# Patient Record
Sex: MALE | Race: WHITE | Employment: OTHER | URBAN - METROPOLITAN AREA
[De-identification: names, ages, dates, MRNs, and addresses within clinical notes are randomized per-mention and may not be internally consistent; named-entity substitution may affect disease eponyms.]

---

## 2022-09-20 ENCOUNTER — OFFICE VISIT (OUTPATIENT)
Dept: ORTHOPEDIC SURGERY | Age: 67
End: 2022-09-20
Payer: MEDICARE

## 2022-09-20 DIAGNOSIS — M19.011 PRIMARY OSTEOARTHRITIS OF RIGHT SHOULDER: Primary | ICD-10-CM

## 2022-09-20 DIAGNOSIS — M25.512 BILATERAL SHOULDER PAIN, UNSPECIFIED CHRONICITY: ICD-10-CM

## 2022-09-20 DIAGNOSIS — M25.511 BILATERAL SHOULDER PAIN, UNSPECIFIED CHRONICITY: ICD-10-CM

## 2022-09-20 PROCEDURE — G8421 BMI NOT CALCULATED: HCPCS | Performed by: PHYSICIAN ASSISTANT

## 2022-09-20 PROCEDURE — 1123F ACP DISCUSS/DSCN MKR DOCD: CPT | Performed by: PHYSICIAN ASSISTANT

## 2022-09-20 PROCEDURE — G8427 DOCREV CUR MEDS BY ELIG CLIN: HCPCS | Performed by: PHYSICIAN ASSISTANT

## 2022-09-20 PROCEDURE — 3017F COLORECTAL CA SCREEN DOC REV: CPT | Performed by: PHYSICIAN ASSISTANT

## 2022-09-20 PROCEDURE — 4004F PT TOBACCO SCREEN RCVD TLK: CPT | Performed by: PHYSICIAN ASSISTANT

## 2022-09-20 PROCEDURE — 99204 OFFICE O/P NEW MOD 45 MIN: CPT | Performed by: PHYSICIAN ASSISTANT

## 2022-09-20 NOTE — PROGRESS NOTES
patient, alert and oriented, in no distress. WDWN. HEENT:  Normocephalic, atraumatic. Extraocular muscles are intact. Neck:  Supple, no lymphadenopathy. Heart:  Regular pulse exam on all extremities. Good color and warmth noted. Lungs:  Normal non-labored breathing with no obvious shortness of breath. Abdomen:  WNL's. Skin:  No signs of rash or bruising. Pysch: Answers questions appropriately, AO X 3. MSK:  Cervical spine: No tenderness. Appropriate active motion      SHOULDER   Left   Right   Skin Intact Intact   Radial Pulses 2+ symmetrical  2+ symmetrical   Myotomes Normal Normal   Dermatomes  Normal Normal   ROM  abduction 70  , ER 90   Strength Appropriate Appropriate   Atrophy None noted None noted   Effusion/Swelling  None None   Palpation Negative  Negative   Bicep Tendon Rupture  Negative negative   Bear Hug, Belly Press Negative Negative   Crossed Arm Adduction Test Not tested Not tested   Instability/Ant. Apprehension Test None  None   Impingement limited limited          X-rays bilateral shoulders:   AP, Grashey, outlet and axillary views of the right  and left shoulder were obtained today and reviewed in the office. They show significant bone on bone osteoarthritis of bilateral shoulders. X-ray impression:  Right shoulder arthritis, Left shoulder arthritis    A/Plan:     ICD-10-CM    1. Primary osteoarthritis of right shoulder  M19.011 CT SHOULDER RIGHT WO CONTRAST      2. Bilateral shoulder pain, unspecified chronicity  M25.511 XR SHOULDER BILATERAL STANDARD    M25.512 CT SHOULDER RIGHT WO CONTRAST          I had a long discussion with the patient regarding the natural history of the problem, as well as treatment options. The patient is at the point where he has tried and failed conservative options. He does not want to proceed with conservative measures. He has made the decision to proceed surgically. We discussed CT scan of the right shoulder for surgical planning.   GARY with Dr. Arleen Holman after. Discussed with the patient he will need to quit tobacco use as he is a smoker and this increases risk of failure and infection. Treatment:   Given the chronicity and severity of the patient's symptoms, we will obtain an CT. We will see them back after the scan and make a determination regarding further treatment. If there is a tear or other problem, they may require surgery. If negative, would recommend NSAID's, PT, or injection. They are amenable to this treatment plan. Return for CT results with Dr. Arleen Holman. Thank you for the opportunity to see and take care of your patients. If you ever have any questions please give me a call.    Sincerely,  KIKO SANDERS MA  09/20/22

## 2022-09-23 PROBLEM — L02.212 ABSCESS OF BACK: Status: ACTIVE | Noted: 2021-06-12

## 2022-09-23 PROBLEM — L82.1 SK (SEBORRHEIC KERATOSIS): Status: ACTIVE | Noted: 2018-03-13

## 2022-09-23 PROBLEM — G89.29 CHRONIC PAIN OF BOTH SHOULDERS: Status: ACTIVE | Noted: 2017-10-17

## 2022-09-23 PROBLEM — M25.512 CHRONIC PAIN OF BOTH SHOULDERS: Status: ACTIVE | Noted: 2017-10-17

## 2022-09-23 PROBLEM — M19.019 AC JOINT ARTHROPATHY: Status: ACTIVE | Noted: 2022-06-01

## 2022-09-23 PROBLEM — M19.019 LOCALIZED, PRIMARY OSTEOARTHRITIS OF SHOULDER REGION: Status: ACTIVE | Noted: 2022-09-23

## 2022-09-23 PROBLEM — R97.20 ELEVATED PSA: Status: ACTIVE | Noted: 2020-01-03

## 2022-09-23 PROBLEM — M25.511 CHRONIC PAIN OF BOTH SHOULDERS: Status: ACTIVE | Noted: 2017-10-17

## 2022-09-23 PROBLEM — R21 RASH AND OTHER NONSPECIFIC SKIN ERUPTION: Status: ACTIVE | Noted: 2020-12-12

## 2022-09-23 PROBLEM — F17.200 SMOKER: Status: ACTIVE | Noted: 2018-01-30

## 2022-09-23 PROBLEM — M75.102 LEFT ROTATOR CUFF TEAR ARTHROPATHY: Status: ACTIVE | Noted: 2022-09-01

## 2022-09-23 PROBLEM — L81.9 ATYPICAL PIGMENTED SKIN LESION: Status: ACTIVE | Noted: 2019-10-17

## 2022-09-23 PROBLEM — M12.812 LEFT ROTATOR CUFF TEAR ARTHROPATHY: Status: ACTIVE | Noted: 2022-09-01

## 2022-09-23 PROBLEM — N48.6 PEYRONIE DISEASE: Status: ACTIVE | Noted: 2020-07-07

## 2022-09-23 RX ORDER — TRIAMCINOLONE ACETONIDE 5 MG/G
CREAM TOPICAL 2 TIMES DAILY
COMMUNITY
Start: 2021-06-02

## 2022-09-23 RX ORDER — TADALAFIL 20 MG/1
20 TABLET ORAL PRN
COMMUNITY
Start: 2020-10-07

## 2022-09-23 RX ORDER — LEVOTHYROXINE SODIUM 0.12 MG/1
TABLET ORAL
COMMUNITY
Start: 2022-09-02

## 2022-09-23 RX ORDER — ALPRAZOLAM 0.5 MG/1
TABLET ORAL
COMMUNITY
Start: 2022-09-01

## 2023-02-20 ENCOUNTER — TELEPHONE (OUTPATIENT)
Dept: ORTHOPEDIC SURGERY | Age: 68
End: 2023-02-20

## 2023-02-20 DIAGNOSIS — M25.512 BILATERAL SHOULDER PAIN, UNSPECIFIED CHRONICITY: ICD-10-CM

## 2023-02-20 DIAGNOSIS — M19.011 PRIMARY OSTEOARTHRITIS OF RIGHT SHOULDER: Primary | ICD-10-CM

## 2023-02-20 DIAGNOSIS — M25.511 BILATERAL SHOULDER PAIN, UNSPECIFIED CHRONICITY: ICD-10-CM

## 2023-02-20 NOTE — TELEPHONE ENCOUNTER
Spoke with pt and let him know that we are very sorry to hear about his daughter. I let him know that I ordered that CT scan and have sent it over to [facility] and they will call him to schedule. I also let him know that once he has that scheduled that if he could call us back so we can get him on the schedule to go over the results, we usually need to bring them in about a week later so the scan can be uploaded. He expressed understanding and thanked me for calling.

## 2023-02-20 NOTE — TELEPHONE ENCOUNTER
He was seen in Sept. He was supposed to get a CT of his right shoulder but states he never heard from anyone. Soon after that appt his daughter passed away and he found her and he says he didn't think of anything else but that for a long time. His shoulder is really hurting and he would like to go ahead and get the CT. Can you let him know if this is possible.

## 2023-02-23 ENCOUNTER — TELEPHONE (OUTPATIENT)
Dept: ORTHOPEDIC SURGERY | Age: 68
End: 2023-02-23

## 2023-02-23 NOTE — TELEPHONE ENCOUNTER
Pt  called and  said  he  is  to  let  you know  that he  has  not  heard  from anyone  about  getting  his  CT

## 2023-02-24 ENCOUNTER — TELEPHONE (OUTPATIENT)
Dept: ORTHOPEDIC SURGERY | Age: 68
End: 2023-02-24

## 2023-02-24 NOTE — TELEPHONE ENCOUNTER
Left pt a VM on his other number letting him know that I think there has been a number issue and that may be why he has not heard from them. I changed the priority and also left the number for [facility] for him to call and schedule with them.

## 2023-02-24 NOTE — TELEPHONE ENCOUNTER
Spoke with pt and got him on the schedule to go over results with Dr. Michelle Caraballo after completion of scan. He thanked me for calling.

## 2023-02-28 DIAGNOSIS — M25.511 BILATERAL SHOULDER PAIN, UNSPECIFIED CHRONICITY: ICD-10-CM

## 2023-02-28 DIAGNOSIS — M25.512 BILATERAL SHOULDER PAIN, UNSPECIFIED CHRONICITY: ICD-10-CM

## 2023-02-28 DIAGNOSIS — M19.011 PRIMARY OSTEOARTHRITIS OF RIGHT SHOULDER: ICD-10-CM

## 2023-03-07 ENCOUNTER — OFFICE VISIT (OUTPATIENT)
Dept: ORTHOPEDIC SURGERY | Age: 68
End: 2023-03-07

## 2023-03-07 DIAGNOSIS — G56.21 CUBITAL TUNNEL SYNDROME ON RIGHT: ICD-10-CM

## 2023-03-07 DIAGNOSIS — M19.011 PRIMARY OSTEOARTHRITIS OF RIGHT SHOULDER: ICD-10-CM

## 2023-03-07 DIAGNOSIS — M25.512 BILATERAL SHOULDER PAIN, UNSPECIFIED CHRONICITY: Primary | ICD-10-CM

## 2023-03-07 DIAGNOSIS — M25.511 BILATERAL SHOULDER PAIN, UNSPECIFIED CHRONICITY: Primary | ICD-10-CM

## 2023-03-07 NOTE — PATIENT INSTRUCTIONS
Shoulder Joint Replacement  Although shoulder joint replacement is less common than knee or hip replacement, it is just as successful in relieving joint pain. Shoulder replacement surgery was first performed in the [facility] in the 1950s to treat severe shoulder fractures. Over the years, shoulder joint replacement has come to be used for many other painful conditions of the shoulder, such as different forms of arthritis. Today, about 53,000 people in the [place] have shoulder replacement surgery each year, according to the [employer] [facility] [employer]. This compares to more than 900,000 Americans a year who have knee and hip replacement surgery. If nonsurgical treatments like medications and activity changes are no longer helpful for relieving pain, you may want to consider shoulder joint replacement surgery. Joint replacement surgery is a safe and effective procedure to relieve pain and help you resume everyday activities. Whether you have just begun exploring treatment options or have already decided to have shoulder joint replacement surgery, this article will help you understand more about this valuable procedure. Anatomy  Your shoulder is made up of three bones: your upper arm bone (humerus), your shoulder blade (scapula), and your collarbone (clavicle). The shoulder is a ball-and-socket joint: The ball, or head, of your upper arm bone fits into a shallow socket in your shoulder blade. This socket is called the glenoid. The surfaces of the bones where they touch are covered with articular cartilage, a smooth substance that protects the bones and enables them to move easily. A thin, smooth tissue called synovial membrane covers all remaining surfaces inside the shoulder joint. In a healthy shoulder, this membrane makes a small amount of fluid that lubricates the cartilage and eliminates almost any friction in your shoulder.     The muscles and tendons that surround the shoulder provide stability and support. All of these structures allow the shoulder to rotate through a greater range of motion than any other joint in the body. Description  In shoulder replacement surgery, the damaged parts of the shoulder are removed and replaced with artificial components, called a prosthesis. The treatment options are either replacement of just the head of the humerus bone (ball), or replacement of both the ball and the socket (glenoid). Cause  Several conditions can cause shoulder pain and disability, and lead patients to consider shoulder joint replacement surgery. Osteoarthritis (Degenerative Joint Disease)  Osteoarthritis is an age-related wear-and-tear type of arthritis. It usually occurs in people 48years of age and older, but may occur in younger people, too. The cartilage that cushions the bones of the shoulder softens and wears away. The bones then rub against one another. Over time, the shoulder joint slowly becomes stiff and painful. Unfortunately, there is no way to prevent the development of osteoarthritis. It is a common reason people have shoulder replacement surgery. Rheumatoid Arthritis  This is a disease in which the synovial membrane that surrounds the joint becomes inflamed and thickened. This chronic inflammation can damage the cartilage and eventually cause cartilage loss, pain, and stiffness. Rheumatoid arthritis is the most common form of a group of disorders termed inflammatory arthritis. Post-traumatic Arthritis  This can follow a serious shoulder injury. Fractures of the bones that make up the shoulder or tears of the shoulder tendons or ligaments may damage the articular cartilage over time. This causes shoulder pain and limits shoulder function. Rotator Cuff Tear Arthropathy  A patient with a very large, long-standing rotator cuff tear may develop cuff tear arthropathy.  In this condition, the changes in the shoulder joint due to the rotator cuff tear may lead to arthritis and destruction of the joint cartilage. Avascular Necrosis (Osteonecrosis)  Avascular necrosis, or osteonecrosis, is a painful condition that occurs when the blood supply to the bone is disrupted. Because bone cells die without a blood supply, osteonecrosis can ultimately cause destruction of the shoulder joint and lead to arthritis. Chronic steroid use, deep sea diving, severe fracture of the shoulder, sickle cell disease, and heavy alcohol use are all risk factors for avascular necrosis. Severe Fractures  A severe fracture of the shoulder is another common reason people have shoulder replacements. When the head of the upper arm bone is shattered, it may be very difficult for a doctor to put the pieces of bone back in place. In addition, the blood supply to the bone pieces can be interrupted. In this case, a surgeon may recommend a shoulder replacement. Older patients with osteoporosis are most at risk for severe shoulder fractures. Failed Previous Shoulder Replacement Surgery  Although uncommon, some shoulder replacements fail, most often because of implant loosening, wear, infection, and dislocation. When this occurs, a second joint replacement surgery -- called a revision surgery -- may be necessary. Is Shoulder Joint Replacement for You? The decision to have shoulder replacement surgery should be a cooperative one between you, your family, your family physician, and your orthopaedic surgeon. There are several reasons why your doctor may recommend shoulder replacement surgery. People who benefit from surgery often have:    Severe shoulder pain that interferes with everyday activities, such as reaching into a cabinet, dressing, toileting, and washing. Moderate to severe pain while resting. This pain may be severe enough to prevent a good night's sleep. Loss of motion and/or weakness in the shoulder.   Failure to substantially improve with other treatments such as anti-inflammatory medications, cortisone injections, and/or physical therapy. Orthopaedic Evaluation  Your family physician may refer you to an orthopaedic surgeon for a thorough evaluation to determine if you can benefit from this surgery. An evaluation with an orthopaedic surgeon consists of several components:    A medical history. Your orthopaedic surgeon will gather information about your general health and ask you about the extent of your shoulder pain and your ability to function. A physical examination. This will assess shoulder motion, stability, and strength. X-rays. X-rays help to determine the extent of damage in your shoulder. They can show loss of the normal joint space between bones, flattening or irregularity in the shape of the bone, bone spurs, and loose pieces of cartilage or bone that may be floating inside the joint. Other tests. Occasionally, your doctor may order blood tests, a computed tomography (CT) scan, a magnetic resonance imaging (MRI) scan, or a bone scan to determine the condition of the bone and soft tissues of your shoulder. Your orthopaedic surgeon will review the results of your evaluation with you and discuss whether shoulder joint replacement is the best method to relieve your pain and improve your function. Other treatment options -- including medications, injections, physical therapy, or other types of surgery -- will also be discussed and considered. Shoulder Replacement Options  Shoulder replacement surgery is highly technical. It should be performed by a surgical team with experience in this procedure. There are different types of shoulder replacements. Your surgeon will evaluate your situation carefully before making any decisions. They will discuss with you which type of replacement will best meet your health needs. Do not hesitate to ask which type of implant will be used in your situation, and why that choice is right for you.     Total Shoulder Replacement  The standard total shoulder replacement involves replacing the arthritic joint surfaces with a highly polished metal ball attached to a stem, and a plastic socket. These components come in various sizes. They may be either cemented or press fit into the bone. If the bone is of good quality, your surgeon may choose to use a non-cemented (press-fit) humeral component. If the bone is soft, the humeral component may be implanted with bone cement. In most cases, an all-plastic glenoid (socket) component is implanted with bone cement. Implantation of a glenoid component is not advised if:    The glenoid has good cartilage  The glenoid bone is severely deficient  The rotator cuff tendons are irreparably torn  Patients with bone-on-bone osteoarthritis and intact rotator cuff tendons are generally good candidates for conventional total shoulder replacement. Stemmed Hemiarthroplasty  Depending on the condition of your shoulder, your surgeon may replace only the ball. This procedure is called a hemiarthroplasty. In a traditional hemiarthroplasty, the surgeon replaces the head of the humerus with a metal ball and stem, similar to the component used in a total shoulder replacement. This is called a stemmed hemiarthroplasty. Some surgeons recommend hemiarthroplasty when the humeral head is severely fractured but the socket is normal. Other indications for a hemiarthroplasty include:    Arthritis that involves only the head of the humerus, with a glenoid that has a healthy and intact cartilage surface  Shoulders with severely weakened bone in the glenoid  Some shoulders with severely torn rotator cuff tendons and arthritis  Sometimes, surgeons make the decision between a total shoulder replacement and a hemiarthroplasty in the operating room at the time of the surgery.     Studies show that patients with osteoarthritis get better pain relief from total shoulder arthroplasty than from hemiarthroplasty. Resurfacing Hemiarthroplasty  Resurfacing hemiarthroplasty involves replacing just the joint surface of the humeral head with a cap-like prosthesis without a stem. With its bone-preserving advantage, it offers those with arthritis of the shoulder an alternative to the standard stemmed shoulder replacement. Resurfacing hemiarthroplasty may be an option for you if:    The glenoid still has an intact cartilage surface  There has been no fresh fracture of the humeral neck or head  There is a desire to preserve humeral bone  For patients who are young or very active, resurfacing hemiarthroplasty avoids the risks of component wear and loosening that may occur with conventional total shoulder replacements in this patient population. Due to its more conservative nature, resurfacing hemiarthroplasty may be easier to convert to total shoulder replacement, if necessary, at a later time. Reverse Total Shoulder Replacement  Another type of shoulder replacement is called reverse total shoulder replacement. Reverse total shoulder replacement is used for people who have:    Completely torn rotator cuffs with severe arm weakness  The effects of severe arthritis and rotator cuff tearing (cuff tear arthropathy)  Had a previous shoulder replacement that failed    For these individuals, a conventional total shoulder replacement can still leave them with pain. They may also be unable to lift their arm up past a 90-degree angle. Not being able to lift your arm away from the side can be severely debilitating. In reverse total shoulder replacement, the socket and metal ball are switched: A metal ball is attached to the shoulder bone, and a plastic socket is attached to the upper arm bone. This allows the patient to use the deltoid muscle instead of the torn rotator cuff to lift the arm.       Complications  Your orthopaedic surgeon will explain the potential risks and complications of shoulder joint replacement, including those related to the surgery itself and those that can occur over time after your surgery. When complications occur, most are successfully treatable. Possible complications include the following. Infection. Infection is a complication of any surgery. In shoulder joint replacement, infection may occur in the wound or deep around the prosthesis. It may happen while in the hospital or after you go home. It may even occur years later. Minor infections in the wound area are generally treated with antibiotics. Major or deep infections may require more surgery and removal of the prosthesis. Any infection in your body can spread to your joint replacement. Prosthesis Problems. Although prosthesis designs and materials, as well as surgical techniques, continue to advance, the prosthesis may wear down and the components may loosen. The components of a shoulder replacement may also dislocate. Excessive wear, loosening, or dislocation may require additional surgery (revision procedure). Nerve damage. Nerves in the vicinity of the joint replacement may be damaged during surgery, although this type of injury is infrequent. Over time, these nerve injuries often improve and may completely recover. Preparing for Surgery  Medical Evaluation  If you decide to have shoulder replacement surgery, your orthopaedic surgeon may ask you to schedule a complete physical examination with your family physician several weeks before surgery. This is needed to make sure you are healthy enough to have the surgery and complete the recovery process. Many patients with chronic medical conditions, like heart disease, must also be evaluated by a specialist, such as a cardiologist, before the surgery. Medications  Be sure to talk to your orthopaedic surgeon about the medications you take. Some medications may need to be stopped before surgery.  For example, the following over-the-counter medicines may cause excessive bleeding and should be stopped 2 weeks before surgery:    Non-steroidal anti-inflammatory drugs (NSAIDs), such as aspirin, ibuprofen, and naproxen  Most arthritis medications    If you take blood thinners, either your primary care doctor or cardiologist will advise you about stopping these medications before surgery. Home Planning  Making simple changes in your home before surgery can make your recovery period easier. For the first several weeks after your surgery, it will be hard to reach high shelves and cupboards. Before your surgery, be sure to go through your home and place any items you may need afterwards on low shelves. When you come home from the hospital, you will need help for a few weeks with some daily tasks like dressing, bathing, cooking, and laundry. If you will not have any support at home immediately after surgery, you may need a short stay in a rehabilitation facility until you become more independent. Your Surgery  Before Your Operation  Wear loose-fitting clothes and a button-front shirt when you go to the hospital for your surgery. After surgery, you will be wearing a sling and will have limited use of your arm. You will most likely be admitted to the hospital on the day of your surgery. After admission, you will be taken to the preoperative preparation area and will meet a doctor from the anesthesia department. You, your anesthesiologist, and your surgeon will discuss the type of anesthesia to be used. You may be provided a general anesthetic (you are asleep for the entire operation), a regional anesthetic (you may be awake but have no feeling around the surgical area), or a combination of both types. Surgical Procedure  The procedure to replace your shoulder joint with an artificial device usually takes about 2 hours. After surgery, you will be moved to the recovery room, where you will remain for several hours while your recovery from anesthesia is monitored.  After you wake up, you will be taken to your hospital room. Recovery  Your medical team will give you several doses of antibiotics to prevent infection. Most patients are able to eat solid food and get out of bed the day after surgery. You will most likely be able to go home on the first, second, or third day after surgery. Pain Management  After surgery, you will feel some pain. This is a natural part of the healing process. Your doctor and nurses will work to reduce your pain, which can help you recover from surgery faster. Medications are often prescribed for short-term pain relief after surgery. Many types of medicines are available to help manage pain, including opioids, non-steroidal anti-inflammatory drugs (NSAIDs), and local anesthetics. Your doctor may use a combination of these medications to improve pain relief, as well as minimize the need for opioids. Be aware that although opioids help relieve pain after surgery, they are a narcotic and can be addictive. Opioid dependency and overdose has become a critical public health issue in the [place] It is important to use opioids only as directed by your doctor and to stop taking them as soon as your pain begins to improve. Talk to your doctor if your pain has not begun to improve within a few days of your surgery. Pain management is an important part of your recovery. You will begin physical therapy soon after surgery, and when you feel less pain, you can start moving sooner and get your strength back more quickly. Talk with your doctor if postoperative pain becomes a problem. Rehabilitation  A careful, well-planned rehabilitation program is critical to the success of a shoulder replacement. You usually start gentle physical therapy soon after the operation. Your surgeon or physical therapist will provide you with a home exercise program to strengthen your shoulder and improve flexibility.     Your Recovery At Home  When you leave the hospital, your arm will be in a sling. You will need the sling to support and protect your shoulder for the first 2 to 6 weeks after surgery, depending on the complexity of your surgery and your surgeon's preference. Wound care. You will have staples running along your wound or a suture beneath your skin. The staples will be removed several weeks after surgery. A dissolving suture beneath your skin will not require removal.    Avoid soaking the wound in water until it has thoroughly sealed and dried. You may continue to bandage the wound to prevent irritation from clothing. Activity. Exercise is a critical component of home care, particularly during the first few weeks after surgery. Follow your surgeon's home exercise plan to help you regain strength. Most patients are able to perform simple activities such as eating, dressing, and grooming, within 2 weeks after surgery. Some pain with activity and at night is common for several weeks after surgery. You are not allowed to drive a car for 2 to 6 weeks after surgery. Do's and Don'ts  The success of your surgery will depend largely on how well you follow your orthopaedic surgeon's instructions at home during the first few weeks after surgery. Here are some common do's and don'ts for when you return home:    Don't use the arm to push yourself up in bed or from a chair because this requires forceful contraction of muscles. Do follow the program of home exercises prescribed for you. You may need to do the exercises 2 to 3 times a day for a month or more. Don't overdo it! If your shoulder pain was severe before the surgery, the experience of pain-free motion may lull you into thinking that you can do more than is prescribed. Early overuse of the shoulder may result in severe limitations in motion. Don't lift anything heavier than a glass of water for the first 2 to 4 weeks after surgery. Do ask for assistance.  Your physician may be able to recommend an agency or facility to help if you do not have home support. Don't participate in contact sports or do any repetitive heavy lifting after your shoulder replacement. Do avoid placing your arm in any extreme position, such as straight out to the side or behind your body for the first 6 weeks after surgery. Many thousands of patients have experienced an improved quality of life after shoulder joint replacement surgery. They experience less pain, improved motion and strength, and better function. Research  There are ongoing efforts to design and develop newer and better shoulder replacements that can be performed with less invasive surgical techniques. And researchers are collecting data to determine which patients are the best candidates for which type of shoulder replacement surgery. This information will allow your surgeon to offer you the best recommendation for the treatment of your arthritic shoulder. To help doctors in the management of glenohumeral joint arthritis -- the [employer] has conducted research to provide some useful guidelines. These are recommendations only and may not apply to every case. For more information: Glenohumeral Joint Osteoarthritis - Clinical Practice Guideline (CPG)  [employer] (aaos. org)

## 2023-03-07 NOTE — PROGRESS NOTES
Name: Kameron Nunes  YOB: 1955  Gender: male  MRN: [id number]    CC:   Chief Complaint   Patient presents with    Follow-up     CT results right shoulder    Right shoulder(s) pain, stiffness    HPI: Patient presents for CT follow-up of the right shoulder for surgical Eduardo Partida discussion previously seen my PA. This patient presents with a 20 year history of left shoulder pain and a 4 year history of right shoulder pain. Patient reports feeling a deep pain in both shoulders. He has been experiencing popping, cracking, and grinding in the shoulder joint. Patient reports interference with sleep. He has tried Voltaren with only temporary relief. States over the last couple of weeks he has noticed more numbness and tingling rating down his forearm into his small and ring finger. No Known Allergies  History reviewed. No pertinent past medical history. History reviewed. No pertinent surgical history. History reviewed. No pertinent family history. Social History     Socioeconomic History    Marital status: Unknown     Spouse name: Not on file    Number of children: Not on file    Years of education: Not on file    Highest education level: Not on file   Occupational History    Not on file   Tobacco Use    Smoking status: Every Day     Types: Cigarettes    Smokeless tobacco: Never   Substance and Sexual Activity    Alcohol use: Not on file    Drug use: Not on file    Sexual activity: Not on file   Other Topics Concern    Not on file   Social History Narrative    Not on file     Social Determinants of Health     Financial Resource Strain: Not on file   Food Insecurity: Not on file   Transportation Needs: Not on file   Physical Activity: Not on file   Stress: Not on file   Social Connections: Not on file   Intimate Partner Violence: Not on file   Housing Stability: Not on file        No flowsheet data found.       Review of Systems  Hypothyroidism, smoking  Also noted on the patient medical history form on the chart and are reviewed today. Pertinent positives and negatives are addressed with the patient, particularly those related to musculoskeletal concerns. Non-orthopaedic concerns were referred back to the primary care physician. PE:    GEN: General appearance is that of a healthy patient, alert and oriented, in no distress. WDWN. HEENT:  Normocephalic, atraumatic. Extraocular muscles are intact. Neck:  Supple, no lymphadenopathy. Heart:  Regular pulse exam on all extremities. Good color and warmth noted. Lungs:  Normal non-labored breathing with no obvious shortness of breath. Abdomen:  WNL's. Skin:  No signs of rash or bruising. Pysch: Answers questions appropriately, AO X 3. MSK:  Cervical spine: No tenderness. Normal active motion. normal Spurling's maneuver. SHOULDER   Right (involved)  left   Skin Intact Intact   Radial Pulses 2+ symmetrical  2+ symmetrical   Myotomes Normal Normal   Dermatomes  Normal Normal   ROM Flexion 75, abduction 65 external rotation -10 Flexion 80 abduction 60 external rotation -15   Strength Moderate weakness in the scapular plane Weakness with external rotation as well as scapular plane testing   Atrophy None noted None noted   Effusion/Swelling  None None   Palpation Mild anterior tenderness Same   Bicep Tendon Rupture  Negative Negative   Bear Hug, Belly Press Not tested Not tested   Crossed Arm Adduction Test Not tested Not tested   Instability/Ant. Apprehension Test None  None   Impingement limited Limited          CT right shoulder from 2-28-23:  FINDINGS: Severe erosive osteoarthrosis of the glenohumeral joint with  severe joint narrowing, subchondral sclerosis and erosions along the  articular margins of the humerus and the glenoid. Bony hypertrophy of the  glenoid circumferentially noted. Large spur along the inferior margin of the  humerus. .  Small joint effusion. No radiopaque loose bodies are seen.   31 degrees retroversion of the glenoid. Glenoid stock estimated 27 mm. Walch type B2 glenoid configuration. Mild degenerative disease of the [facility] joint. No significant bony hypertrophy  is seen. Narrowing of the acromiohumeral interval. No full-thickness tear  of the rotator cuff is seen. Remainder of the bony shoulder girdle and bony thorax intact. Imaged lung demonstrates moderate emphysema with scattered areas of pulmonary  fibrosis, scarring and dependent atelectasis. Moderate number of shoddy right axillary lymph nodes. CONCLUSION: Advanced glenohumeral erosive osteoarthrosis as described. Has almost 21 degrees of retroversion. On Equinox planning appendectomy would be an 8 degree augmented baseplate with 42 glenosphere which would give at least 12 mm of lateralization and approximately 24 mm of lengthening. A/Plan:     ICD-10-CM    1. Bilateral shoulder pain, unspecified chronicity  M25.511 Ambulatory referral to [address] [place] [address]    M25.512       2. Primary osteoarthritis of right shoulder  M19.011 Ambulatory referral to [address] [place] [address]      3. Cubital tunnel syndrome on right  G56.21 Ambulatory referral to [address] [place] [address]          I had a long discussion with the patient regarding the natural history of the problem, as well as treatment options. Treatment: We will set him up for nerve conduction study to evaluate for cubital tunnel given the likely need for reverse shoulder replacement. He has severe retroversion and severe glenohumeral arthritic change. His motion is severely limited as well. Also discussed smoking cessation prior to surgery. A handout from the [employer] on total shoulder replacement was provided. Return after NCS. Thank you for the opportunity to see and take care of your patients. If you ever have any questions please give me a call.    Sincerely,  Chela Eden MD  03/07/23

## 2023-03-16 ENCOUNTER — PROCEDURE VISIT (OUTPATIENT)
Dept: PHYSICAL MEDICINE AND REHAB | Age: 68
End: 2023-03-16

## 2023-03-16 VITALS
HEIGHT: 71 IN | SYSTOLIC BLOOD PRESSURE: 99 MMHG | BODY MASS INDEX: 26.46 KG/M2 | WEIGHT: 189 LBS | DIASTOLIC BLOOD PRESSURE: 72 MMHG | HEART RATE: 97 BPM

## 2023-03-16 DIAGNOSIS — G56.01 RIGHT CARPAL TUNNEL SYNDROME: Primary | ICD-10-CM

## 2023-03-21 ENCOUNTER — TELEPHONE (OUTPATIENT)
Dept: ORTHOPEDIC SURGERY | Age: 68
End: 2023-03-21

## 2023-03-21 NOTE — TELEPHONE ENCOUNTER
Patient calling to say he has had the emg done and was to call when that was completed to discuss surgery

## 2023-03-28 ENCOUNTER — OFFICE VISIT (OUTPATIENT)
Dept: ORTHOPEDIC SURGERY | Age: 68
End: 2023-03-28
Payer: MEDICARE

## 2023-03-28 DIAGNOSIS — M25.512 BILATERAL SHOULDER PAIN, UNSPECIFIED CHRONICITY: Primary | ICD-10-CM

## 2023-03-28 DIAGNOSIS — M25.511 BILATERAL SHOULDER PAIN, UNSPECIFIED CHRONICITY: Primary | ICD-10-CM

## 2023-03-28 DIAGNOSIS — G56.21 CUBITAL TUNNEL SYNDROME ON RIGHT: ICD-10-CM

## 2023-03-28 DIAGNOSIS — M19.011 PRIMARY OSTEOARTHRITIS OF RIGHT SHOULDER: ICD-10-CM

## 2023-03-28 DIAGNOSIS — G56.01 CARPAL TUNNEL SYNDROME OF RIGHT WRIST: ICD-10-CM

## 2023-03-28 PROBLEM — N18.2 STAGE 2 CHRONIC KIDNEY DISEASE: Status: ACTIVE | Noted: 2022-12-01

## 2023-03-28 PROBLEM — F51.01 PRIMARY INSOMNIA: Status: ACTIVE | Noted: 2023-03-01

## 2023-03-28 PROCEDURE — 3017F COLORECTAL CA SCREEN DOC REV: CPT | Performed by: ORTHOPAEDIC SURGERY

## 2023-03-28 PROCEDURE — G8417 CALC BMI ABV UP PARAM F/U: HCPCS | Performed by: ORTHOPAEDIC SURGERY

## 2023-03-28 PROCEDURE — L3670 SO ACRO/CLAV CAN WEB PRE OTS: HCPCS | Performed by: ORTHOPAEDIC SURGERY

## 2023-03-28 PROCEDURE — G8484 FLU IMMUNIZE NO ADMIN: HCPCS | Performed by: ORTHOPAEDIC SURGERY

## 2023-03-28 PROCEDURE — 4004F PT TOBACCO SCREEN RCVD TLK: CPT | Performed by: ORTHOPAEDIC SURGERY

## 2023-03-28 PROCEDURE — 1123F ACP DISCUSS/DSCN MKR DOCD: CPT | Performed by: ORTHOPAEDIC SURGERY

## 2023-03-28 PROCEDURE — 99215 OFFICE O/P EST HI 40 MIN: CPT | Performed by: ORTHOPAEDIC SURGERY

## 2023-03-28 PROCEDURE — G8427 DOCREV CUR MEDS BY ELIG CLIN: HCPCS | Performed by: ORTHOPAEDIC SURGERY

## 2023-03-28 NOTE — PROGRESS NOTES
Patient was fitted and instructed on the use of a size L Ultrasling for the patient's right shoulder. I provided the following instructions along with proper fitting as noted below. Fitting instructions included   How to adjusting the thumb support and avoiding irritation to hand. Patient was instructed this should not be used until the block given at surgery has worn off and patient has regained feeling in hand. How to manage the quick release connection. The shoulder straps are adjusted to insure correct fit including trimming any excess material.   The shoulder strap crosses over the opposite shoulder being sure to avoid excess pull on neck  Placement of pillow placed at the waist line of the affected shoulder with the Velcro facing away from body allowing for sling attachment. Positioning the elbow in sling as far back as possible providing adequate support  Keeping the forearm close to the body preventing excessive ER   Keeping the hand higher than the elbow to allow for adequate support of arm in sling and avoiding excess swelling to hand. How to detach the shoulder strap valentine and open front panel to allow for proper hygiene. Patient was informed waist belt should stay in place at all times unless told otherwise by the Doctor or Physical Therapist.   Patient was also made aware to bring an oversized shirt to surgery to provide coverage and comfort when leaving the hospital.   The patient was instructed to bring ultrasling, oversized shirt, and iceman pad (if purchased or prescribed) with them on the day of surgery. Patient was fitted and instructed on the use of an Ultrasling for the patients right shoulder. I fitted patient with a size L ultrasling. Patient was instructed to position elbow in sling as far back as possible and that the arm should be internally rotated lying nicely against abdomen.  I demonstrated how the shoulder strap crosses over the opposite shoulder and connects to the
testing   Atrophy None noted None noted   Effusion/Swelling  None None   Palpation Mild anterior tenderness Same   Bicep Tendon Rupture  Negative Negative   Bear Hug, Belly Press Not tested Not tested   Crossed Arm Adduction Test Not tested Not tested   Instability/Ant. Apprehension Test None  None   Impingement limited Limited              EMG/NCS from 3-20-23:      CT right shoulder from 2-28-23:  FINDINGS: Severe erosive osteoarthrosis of the glenohumeral joint with  severe joint narrowing, subchondral sclerosis and erosions along the  articular margins of the humerus and the glenoid. Bony hypertrophy of the  glenoid circumferentially noted. Large spur along the inferior margin of the  humerus. .  Small joint effusion. No radiopaque loose bodies are seen. 31 degrees retroversion of the glenoid. Glenoid stock estimated 27 mm. Walch type B2 glenoid configuration. [facility] joint. No significant bony hypertrophy  is seen. Narrowing of the acromiohumeral interval. No full-thickness tear  of the rotator cuff is seen. Remainder of the bony shoulder girdle and bony thorax intact. Imaged lung demonstrates moderate emphysema with scattered areas of pulmonary  fibrosis, scarring and dependent atelectasis. Moderate number of shoddy right axillary lymph nodes. CONCLUSION: Advanced glenohumeral erosive osteoarthrosis as described. Has almost 21 degrees of retroversion. On Equinox planning natali would be an 8 degree augmented baseplate with 42 glenosphere which would give at least 12 mm of lateralization and approximately 24 mm of lengthening. A/Plan:     ICD-10-CM    1. Bilateral shoulder pain, unspecified chronicity  M25.511 Ambulatory Referral [place] [facility]    M25.512 Ambulatory referral to Orthopedic Surgery      2. Primary osteoarthritis of right shoulder  M19.011 Ambulatory Referral to [facility]     Ambulatory referral to Orthopedic Surgery      3.  Cubital tunnel syndrome on right  G56.21

## 2023-03-29 ENCOUNTER — TELEPHONE (OUTPATIENT)
Dept: ORTHOPEDIC SURGERY | Age: 68
End: 2023-03-29

## 2023-03-29 DIAGNOSIS — M25.512 BILATERAL SHOULDER PAIN, UNSPECIFIED CHRONICITY: Primary | ICD-10-CM

## 2023-03-29 DIAGNOSIS — M19.011 PRIMARY OSTEOARTHRITIS OF RIGHT SHOULDER: ICD-10-CM

## 2023-03-29 DIAGNOSIS — M25.511 BILATERAL SHOULDER PAIN, UNSPECIFIED CHRONICITY: Primary | ICD-10-CM

## 2023-03-29 NOTE — TELEPHONE ENCOUNTER
I called and left the pt a voicemail with my direct number to call back in regards to surgery information.

## 2023-04-05 ENCOUNTER — HOSPITAL ENCOUNTER (OUTPATIENT)
Dept: SURGERY | Age: 68
Discharge: HOME | End: 2023-04-08
Payer: MEDICARE

## 2023-04-05 VITALS
TEMPERATURE: 98.8 F | HEIGHT: 71 IN | HEART RATE: 77 BPM | WEIGHT: 189.4 LBS | SYSTOLIC BLOOD PRESSURE: 121 MMHG | OXYGEN SATURATION: 96 % | DIASTOLIC BLOOD PRESSURE: 82 MMHG | BODY MASS INDEX: 26.52 KG/M2 | RESPIRATION RATE: 16 BRPM

## 2023-04-05 LAB
ANION GAP SERPL CALC-SCNC: 0 MMOL/L (ref 2–11)
BACTERIA SPEC CULT: NORMAL
BUN SERPL-MCNC: 20 MG/DL (ref 8–23)
CALCIUM SERPL-MCNC: 9 MG/DL (ref 8.3–10.4)
CHLORIDE SERPL-SCNC: 112 MMOL/L (ref 101–110)
CO2 SERPL-SCNC: 31 MMOL/L (ref 21–32)
CREAT SERPL-MCNC: 1.06 MG/DL (ref 0.8–1.5)
ERYTHROCYTE [DISTWIDTH] IN BLOOD BY AUTOMATED COUNT: 12.8 % (ref 11.9–14.6)
GLUCOSE SERPL-MCNC: 86 MG/DL (ref 65–100)
HCT VFR BLD AUTO: 44.1 % (ref 41.1–50.3)
HGB BLD-MCNC: 15.3 G/DL (ref 13.6–17.2)
MCH RBC QN AUTO: 33 PG (ref 26.1–32.9)
MCHC RBC AUTO-ENTMCNC: 34.7 G/DL (ref 31.4–35)
MCV RBC AUTO: 95 FL (ref 82–102)
NRBC # BLD: 0 K/UL (ref 0–0.2)
PLATELET # BLD AUTO: 340 K/UL (ref 150–450)
PMV BLD AUTO: 10.3 FL (ref 9.4–12.3)
POTASSIUM SERPL-SCNC: 4 MMOL/L (ref 3.5–5.1)
RBC # BLD AUTO: 4.64 M/UL (ref 4.23–5.6)
SERVICE CMNT-IMP: NORMAL
SODIUM SERPL-SCNC: 143 MMOL/L (ref 133–143)
WBC # BLD AUTO: 9.7 K/UL (ref 4.3–11.1)

## 2023-04-05 PROCEDURE — 85027 COMPLETE CBC AUTOMATED: CPT

## 2023-04-05 PROCEDURE — 80048 BASIC METABOLIC PNL TOTAL CA: CPT

## 2023-04-05 PROCEDURE — 87641 MR-STAPH DNA AMP PROBE: CPT

## 2023-04-05 RX ORDER — VITAMIN B COMPLEX
TABLET ORAL DAILY
COMMUNITY

## 2023-04-05 RX ORDER — IBUPROFEN 200 MG
600 TABLET ORAL 2 TIMES DAILY
COMMUNITY

## 2023-04-05 RX ORDER — LEVOTHYROXINE SODIUM 0.1 MG/1
100 TABLET ORAL DAILY
COMMUNITY

## 2023-04-05 RX ORDER — BETA-SITOS/D3/MINERALS/CRANBER 125 MG-10
2 TABLET ORAL DAILY
COMMUNITY

## 2023-04-05 RX ORDER — CETIRIZINE HYDROCHLORIDE, PSEUDOEPHEDRINE HYDROCHLORIDE 5; 120 MG/1; MG/1
1 TABLET, FILM COATED, EXTENDED RELEASE ORAL 2 TIMES DAILY PRN
COMMUNITY

## 2023-04-05 ASSESSMENT — PAIN DESCRIPTION - LOCATION: LOCATION: SHOULDER

## 2023-04-05 ASSESSMENT — PAIN DESCRIPTION - ORIENTATION: ORIENTATION: RIGHT

## 2023-04-05 ASSESSMENT — PAIN DESCRIPTION - DESCRIPTORS: DESCRIPTORS: ACHING

## 2023-04-05 ASSESSMENT — PAIN - FUNCTIONAL ASSESSMENT: PAIN_FUNCTIONAL_ASSESSMENT: PREVENTS OR INTERFERES SOME ACTIVE ACTIVITIES AND ADLS

## 2023-04-05 ASSESSMENT — PAIN SCALES - GENERAL: PAINLEVEL_OUTOF10: 8

## 2023-04-05 NOTE — PERIOP NOTE
PLEASE CONTINUE TAKING ALL PRESCRIPTION MEDICATIONS UP TO THE DAY OF SURGERY UNLESS OTHERWISE DIRECTED BELOW. DISCONTINUE all vitamins, herbals and supplements 7 days prior to surgery. DISCONTINUE Non-Steriodal Anti-Inflammatory (NSAIDS) such as Advil, Ibuprofen, Motrin, Naproxen and Aleve 5 days prior to surgery. Home Medications to take  the day of surgery    Xanax if needed, levothyroxine            Home Medications   to Hold   Stop diclofenac gel and ibuprofen five days prior to surgery    Stop vitamins and supplements 7 days prior to surgery      Comments   On the day before surgery please take Acetaminophen 1000mg in the morning and then again before bed. You may substitute for Tylenol 650 mg.           Bring Dynahex wash and Incentive Spirometer with you to hospital on the day of surgery. Please do not bring home medications with you on the day of surgery unless otherwise directed by your nurse. If you are instructed to bring home medications, please give them to your nurse as they will be administered by the nursing staff. If you have any questions, please call Lauryn Suarez [phone]. A copy of this note was provided to the patient for reference.

## 2023-04-05 NOTE — PROGRESS NOTES
TIMES A DAY AS NEEDED FOR ANXIETY 9/1/22   Historical Provider, MD   diclofenac sodium (VOLTAREN) 1 % GEL 2 times daily as needed 9/1/22   Historical Provider, MD     No Known Allergies       Objective:     Physical Exam:   Patient Vitals for the past 24 hrs:   Temp Pulse Resp BP SpO2   04/05/23 0742 98.8 °F (37.1 °C) 77 16 121/82 96 %       ECG:    No results found for this or any previous visit (from the past 4464 hour(s)).     Data Review:   Labs:   Recent Results (from the past 24 hour(s))   CBC    Collection Time: 04/05/23  8:25 AM   Result Value Ref Range    WBC 9.7 4.3 - 11.1 K/uL    RBC 4.64 4.23 - 5.6 M/uL    Hemoglobin 15.3 13.6 - 17.2 g/dL    Hematocrit 44.1 41.1 - 50.3 %    MCV 95.0 82.0 - 102.0 FL    MCH 33.0 (H) 26.1 - 32.9 PG    MCHC 34.7 31.4 - 35.0 g/dL    RDW 12.8 11.9 - 14.6 %    Platelets 084 072 - 557 K/uL    MPV 10.3 9.4 - 12.3 FL    nRBC 0.00 0.0 - 0.2 K/uL   Basic Metabolic Panel    Collection Time: 04/05/23  8:25 AM   Result Value Ref Range    Sodium 143 133 - 143 mmol/L    Potassium 4.0 3.5 - 5.1 mmol/L    Chloride 112 (H) 101 - 110 mmol/L    CO2 31 21 - 32 mmol/L    Anion Gap 0 (L) 2 - 11 mmol/L    Glucose 86 65 - 100 mg/dL    BUN 20 8 - 23 MG/DL    Creatinine 1.06 0.8 - 1.5 MG/DL    Est, Glom Filt Rate >60 >60 ml/min/1.73m2    Calcium 9.0 8.3 - 10.4 MG/DL         Problem List:  )  Patient Active Problem List   Diagnosis    Abscess of back    AC joint arthropathy    Anxiety associated with depression    Atypical pigmented skin lesion    Chronic pain of both shoulders    Chronic right-sided low back pain    Elevated PSA    Erectile dysfunction    Hyperlipidemia    Hypothyroidism    Left rotator cuff tear arthropathy    Localized, primary osteoarthritis of shoulder region    Peyronie disease    Rash and other nonspecific skin eruption    SK (seborrheic keratosis)    Smoker    Primary insomnia    Stage 2 chronic kidney disease    Primary osteoarthritis of right shoulder       Total Joint

## 2023-04-05 NOTE — PERIOP NOTE
Patient verified name and . Order for consent not found in EHR. Type 3 surgery, PAT walk in assessment complete. Labs per surgeon: no orders received. Labs per anesthesia protocol: CBC, BMP; results pending. EKG: none needed per anesthesia guidelines. MRSA/MSSA swab collected; pharmacy to review and dose antibiotic as appropriate. Hospital approved surgical skin cleanser and instructions to return bottle on DOS given per hospital policy. Patient provided with handouts including Guide to Surgery, Pain Management, Hand Hygiene, Blood Transfusion Education, and [place] [facility] Brochure. Patient answered medical/surgical history questions at their best of ability. All prior to admission medications documented in [facility]. Original medication prescription bottle visualized during patient appointment. Patient instructed to hold all vitamins one week prior to surgery and NSAIDS 5 days prior to surgery. Patient teach back successful and patient demonstrates knowledge of instruction.

## 2023-04-14 ENCOUNTER — HOSPITAL ENCOUNTER (OUTPATIENT)
Age: 68
Discharge: HOME | End: 2023-04-15
Attending: ORTHOPAEDIC SURGERY | Admitting: ORTHOPAEDIC SURGERY
Payer: MEDICARE

## 2023-04-14 ENCOUNTER — APPOINTMENT (OUTPATIENT)
Dept: GENERAL RADIOLOGY | Age: 68
End: 2023-04-14
Attending: ORTHOPAEDIC SURGERY
Payer: MEDICARE

## 2023-04-14 DIAGNOSIS — M19.011 PRIMARY OSTEOARTHRITIS OF RIGHT SHOULDER: ICD-10-CM

## 2023-04-14 PROBLEM — M12.811 ROTATOR CUFF TEAR ARTHROPATHY OF RIGHT SHOULDER: Status: ACTIVE | Noted: 2023-04-14

## 2023-04-14 PROBLEM — M75.101 ROTATOR CUFF TEAR ARTHROPATHY OF RIGHT SHOULDER: Status: ACTIVE | Noted: 2023-04-14

## 2023-04-14 LAB
ABO + RH BLD: NORMAL
BLOOD GROUP ANTIBODIES SERPL: NORMAL
SPECIMEN EXP DATE BLD: NORMAL

## 2023-04-14 PROCEDURE — 86900 BLOOD TYPING SEROLOGIC ABO: CPT

## 2023-04-14 PROCEDURE — 3600000005 HC SURGERY LEVEL 5 BASE: Performed by: ORTHOPAEDIC SURGERY

## 2023-04-14 PROCEDURE — C1776 JOINT DEVICE (IMPLANTABLE): HCPCS | Performed by: ORTHOPAEDIC SURGERY

## 2023-04-14 PROCEDURE — 23472 RECONSTRUCT SHOULDER JOINT: CPT | Performed by: ORTHOPAEDIC SURGERY

## 2023-04-14 PROCEDURE — 6360000002 HC RX W HCPCS: Performed by: ANESTHESIOLOGY

## 2023-04-14 PROCEDURE — 3700000000 HC ANESTHESIA ATTENDED CARE: Performed by: ORTHOPAEDIC SURGERY

## 2023-04-14 PROCEDURE — 2700000000 HC OXYGEN THERAPY PER DAY

## 2023-04-14 PROCEDURE — 6370000000 HC RX 637 (ALT 250 FOR IP): Performed by: ANESTHESIOLOGY

## 2023-04-14 PROCEDURE — 73030 X-RAY EXAM OF SHOULDER: CPT

## 2023-04-14 PROCEDURE — 6360000002 HC RX W HCPCS: Performed by: ORTHOPAEDIC SURGERY

## 2023-04-14 PROCEDURE — 2500000003 HC RX 250 WO HCPCS: Performed by: ORTHOPAEDIC SURGERY

## 2023-04-14 PROCEDURE — 3600000015 HC SURGERY LEVEL 5 ADDTL 15MIN: Performed by: ORTHOPAEDIC SURGERY

## 2023-04-14 PROCEDURE — 94760 N-INVAS EAR/PLS OXIMETRY 1: CPT

## 2023-04-14 PROCEDURE — 6370000000 HC RX 637 (ALT 250 FOR IP): Performed by: PHYSICIAN ASSISTANT

## 2023-04-14 PROCEDURE — 2709999900 HC NON-CHARGEABLE SUPPLY: Performed by: ORTHOPAEDIC SURGERY

## 2023-04-14 PROCEDURE — 7100000000 HC PACU RECOVERY - FIRST 15 MIN: Performed by: ORTHOPAEDIC SURGERY

## 2023-04-14 PROCEDURE — 6360000002 HC RX W HCPCS: Performed by: PHYSICIAN ASSISTANT

## 2023-04-14 PROCEDURE — 2580000003 HC RX 258: Performed by: ORTHOPAEDIC SURGERY

## 2023-04-14 PROCEDURE — 7100000001 HC PACU RECOVERY - ADDTL 15 MIN: Performed by: ORTHOPAEDIC SURGERY

## 2023-04-14 PROCEDURE — C1713 ANCHOR/SCREW BN/BN,TIS/BN: HCPCS | Performed by: ORTHOPAEDIC SURGERY

## 2023-04-14 PROCEDURE — 3700000001 HC ADD 15 MINUTES (ANESTHESIA): Performed by: ORTHOPAEDIC SURGERY

## 2023-04-14 PROCEDURE — 2580000003 HC RX 258: Performed by: ANESTHESIOLOGY

## 2023-04-14 PROCEDURE — 2580000003 HC RX 258: Performed by: PHYSICIAN ASSISTANT

## 2023-04-14 PROCEDURE — 94761 N-INVAS EAR/PLS OXIMETRY MLT: CPT

## 2023-04-14 DEVICE — COMPONENT TOT SHLDR CAPPED S3EXACTECH] EXACTECH INC]: Type: IMPLANTABLE DEVICE | Status: FUNCTIONAL

## 2023-04-14 DEVICE — IMPLANTABLE DEVICE
Type: IMPLANTABLE DEVICE | Site: SHOULDER | Status: FUNCTIONAL
Brand: EQUINOXE

## 2023-04-14 RX ORDER — CETIRIZINE HYDROCHLORIDE 5 MG/1
5 TABLET ORAL 2 TIMES DAILY PRN
Status: DISCONTINUED | OUTPATIENT
Start: 2023-04-14 | End: 2023-04-15 | Stop reason: HOSPADM

## 2023-04-14 RX ORDER — OXYCODONE HYDROCHLORIDE 5 MG/1
10 TABLET ORAL PRN
Status: COMPLETED | OUTPATIENT
Start: 2023-04-14 | End: 2023-04-14

## 2023-04-14 RX ORDER — MIDAZOLAM HYDROCHLORIDE 2 MG/2ML
2 INJECTION, SOLUTION INTRAMUSCULAR; INTRAVENOUS
Status: COMPLETED | OUTPATIENT
Start: 2023-04-14 | End: 2023-04-14

## 2023-04-14 RX ORDER — ONDANSETRON 2 MG/ML
4 INJECTION INTRAMUSCULAR; INTRAVENOUS
Status: COMPLETED | OUTPATIENT
Start: 2023-04-14 | End: 2023-04-14

## 2023-04-14 RX ORDER — SODIUM CHLORIDE 0.9 % (FLUSH) 0.9 %
5-40 SYRINGE (ML) INJECTION PRN
Status: DISCONTINUED | OUTPATIENT
Start: 2023-04-14 | End: 2023-04-14 | Stop reason: SDUPTHER

## 2023-04-14 RX ORDER — ROPIVACAINE HYDROCHLORIDE 2 MG/ML
INJECTION, SOLUTION EPIDURAL; INFILTRATION; PERINEURAL PRN
Status: DISCONTINUED | OUTPATIENT
Start: 2023-04-14 | End: 2023-04-14 | Stop reason: HOSPADM

## 2023-04-14 RX ORDER — FENTANYL CITRATE 50 UG/ML
100 INJECTION, SOLUTION INTRAMUSCULAR; INTRAVENOUS
Status: COMPLETED | OUTPATIENT
Start: 2023-04-14 | End: 2023-04-14

## 2023-04-14 RX ORDER — SODIUM CHLORIDE 0.9 % (FLUSH) 0.9 %
5-40 SYRINGE (ML) INJECTION PRN
Status: DISCONTINUED | OUTPATIENT
Start: 2023-04-14 | End: 2023-04-14 | Stop reason: HOSPADM

## 2023-04-14 RX ORDER — LEVOTHYROXINE SODIUM 0.1 MG/1
100 TABLET ORAL DAILY
Status: DISCONTINUED | OUTPATIENT
Start: 2023-04-15 | End: 2023-04-15 | Stop reason: HOSPADM

## 2023-04-14 RX ORDER — BETA-SITOS/D3/MINERALS/CRANBER 125 MG-10
2 TABLET ORAL DAILY
Status: DISCONTINUED | OUTPATIENT
Start: 2023-04-15 | End: 2023-04-15 | Stop reason: HOSPADM

## 2023-04-14 RX ORDER — CETIRIZINE HYDROCHLORIDE, PSEUDOEPHEDRINE HYDROCHLORIDE 5; 120 MG/1; MG/1
1 TABLET, FILM COATED, EXTENDED RELEASE ORAL 2 TIMES DAILY PRN
Status: DISCONTINUED | OUTPATIENT
Start: 2023-04-14 | End: 2023-04-14

## 2023-04-14 RX ORDER — ONDANSETRON 4 MG/1
4 TABLET, ORALLY DISINTEGRATING ORAL EVERY 8 HOURS PRN
Status: DISCONTINUED | OUTPATIENT
Start: 2023-04-14 | End: 2023-04-15 | Stop reason: HOSPADM

## 2023-04-14 RX ORDER — SODIUM CHLORIDE 9 MG/ML
INJECTION, SOLUTION INTRAVENOUS PRN
Status: DISCONTINUED | OUTPATIENT
Start: 2023-04-14 | End: 2023-04-14 | Stop reason: SDUPTHER

## 2023-04-14 RX ORDER — LIDOCAINE HYDROCHLORIDE 10 MG/ML
1 INJECTION, SOLUTION INFILTRATION; PERINEURAL
Status: DISCONTINUED | OUTPATIENT
Start: 2023-04-14 | End: 2023-04-14 | Stop reason: HOSPADM

## 2023-04-14 RX ORDER — DIPHENHYDRAMINE HCL 25 MG
25 CAPSULE ORAL EVERY 6 HOURS PRN
Status: DISCONTINUED | OUTPATIENT
Start: 2023-04-14 | End: 2023-04-15 | Stop reason: HOSPADM

## 2023-04-14 RX ORDER — OXYCODONE AND ACETAMINOPHEN 7.5; 325 MG/1; MG/1
2 TABLET ORAL EVERY 4 HOURS PRN
Status: DISCONTINUED | OUTPATIENT
Start: 2023-04-14 | End: 2023-04-15 | Stop reason: HOSPADM

## 2023-04-14 RX ORDER — SODIUM CHLORIDE 0.9 % (FLUSH) 0.9 %
5-40 SYRINGE (ML) INJECTION EVERY 12 HOURS SCHEDULED
Status: DISCONTINUED | OUTPATIENT
Start: 2023-04-14 | End: 2023-04-14 | Stop reason: HOSPADM

## 2023-04-14 RX ORDER — OXYCODONE AND ACETAMINOPHEN 7.5; 325 MG/1; MG/1
1 TABLET ORAL EVERY 4 HOURS PRN
Status: DISCONTINUED | OUTPATIENT
Start: 2023-04-14 | End: 2023-04-15 | Stop reason: HOSPADM

## 2023-04-14 RX ORDER — SODIUM CHLORIDE, SODIUM LACTATE, POTASSIUM CHLORIDE, CALCIUM CHLORIDE 600; 310; 30; 20 MG/100ML; MG/100ML; MG/100ML; MG/100ML
INJECTION, SOLUTION INTRAVENOUS CONTINUOUS
Status: DISCONTINUED | OUTPATIENT
Start: 2023-04-14 | End: 2023-04-15 | Stop reason: HOSPADM

## 2023-04-14 RX ORDER — EPINEPHRINE 1 MG/ML(1)
AMPUL (ML) INJECTION PRN
Status: DISCONTINUED | OUTPATIENT
Start: 2023-04-14 | End: 2023-04-14 | Stop reason: HOSPADM

## 2023-04-14 RX ORDER — ALBUTEROL SULFATE 2.5 MG/3ML
2.5 SOLUTION RESPIRATORY (INHALATION) EVERY 6 HOURS PRN
Status: DISCONTINUED | OUTPATIENT
Start: 2023-04-14 | End: 2023-04-15 | Stop reason: HOSPADM

## 2023-04-14 RX ORDER — SODIUM CHLORIDE 9 MG/ML
INJECTION, SOLUTION INTRAVENOUS PRN
Status: DISCONTINUED | OUTPATIENT
Start: 2023-04-14 | End: 2023-04-14 | Stop reason: HOSPADM

## 2023-04-14 RX ORDER — OXYCODONE HYDROCHLORIDE 5 MG/1
5 TABLET ORAL PRN
Status: COMPLETED | OUTPATIENT
Start: 2023-04-14 | End: 2023-04-14

## 2023-04-14 RX ORDER — DIPHENHYDRAMINE HYDROCHLORIDE 50 MG/ML
25 INJECTION INTRAMUSCULAR; INTRAVENOUS EVERY 6 HOURS PRN
Status: DISCONTINUED | OUTPATIENT
Start: 2023-04-14 | End: 2023-04-15 | Stop reason: HOSPADM

## 2023-04-14 RX ORDER — SODIUM CHLORIDE 0.9 % (FLUSH) 0.9 %
5-40 SYRINGE (ML) INJECTION EVERY 12 HOURS SCHEDULED
Status: DISCONTINUED | OUTPATIENT
Start: 2023-04-14 | End: 2023-04-15 | Stop reason: HOSPADM

## 2023-04-14 RX ORDER — SODIUM CHLORIDE 9 MG/ML
INJECTION, SOLUTION INTRAVENOUS PRN
Status: DISCONTINUED | OUTPATIENT
Start: 2023-04-14 | End: 2023-04-15 | Stop reason: HOSPADM

## 2023-04-14 RX ORDER — PROCHLORPERAZINE EDISYLATE 5 MG/ML
5 INJECTION INTRAMUSCULAR; INTRAVENOUS
Status: DISCONTINUED | OUTPATIENT
Start: 2023-04-14 | End: 2023-04-14 | Stop reason: HOSPADM

## 2023-04-14 RX ORDER — ACETAMINOPHEN 500 MG
1000 TABLET ORAL ONCE
Status: COMPLETED | OUTPATIENT
Start: 2023-04-14 | End: 2023-04-14

## 2023-04-14 RX ORDER — HYDROMORPHONE HYDROCHLORIDE 1 MG/ML
0.5 INJECTION, SOLUTION INTRAMUSCULAR; INTRAVENOUS; SUBCUTANEOUS
Status: DISCONTINUED | OUTPATIENT
Start: 2023-04-14 | End: 2023-04-15 | Stop reason: HOSPADM

## 2023-04-14 RX ORDER — ALPRAZOLAM 0.5 MG/1
0.5 TABLET ORAL 2 TIMES DAILY PRN
Status: DISCONTINUED | OUTPATIENT
Start: 2023-04-14 | End: 2023-04-15 | Stop reason: HOSPADM

## 2023-04-14 RX ORDER — HYDROMORPHONE HYDROCHLORIDE 1 MG/ML
1 INJECTION, SOLUTION INTRAMUSCULAR; INTRAVENOUS; SUBCUTANEOUS
Status: DISCONTINUED | OUTPATIENT
Start: 2023-04-14 | End: 2023-04-15 | Stop reason: HOSPADM

## 2023-04-14 RX ORDER — SODIUM CHLORIDE 0.9 % (FLUSH) 0.9 %
5-40 SYRINGE (ML) INJECTION PRN
Status: DISCONTINUED | OUTPATIENT
Start: 2023-04-14 | End: 2023-04-15 | Stop reason: HOSPADM

## 2023-04-14 RX ORDER — ONDANSETRON 2 MG/ML
4 INJECTION INTRAMUSCULAR; INTRAVENOUS EVERY 6 HOURS PRN
Status: DISCONTINUED | OUTPATIENT
Start: 2023-04-14 | End: 2023-04-15 | Stop reason: HOSPADM

## 2023-04-14 RX ORDER — SODIUM CHLORIDE 9 MG/ML
INJECTION, SOLUTION INTRAVENOUS CONTINUOUS
Status: DISCONTINUED | OUTPATIENT
Start: 2023-04-14 | End: 2023-04-15 | Stop reason: HOSPADM

## 2023-04-14 RX ORDER — SODIUM CHLORIDE, SODIUM LACTATE, POTASSIUM CHLORIDE, CALCIUM CHLORIDE 600; 310; 30; 20 MG/100ML; MG/100ML; MG/100ML; MG/100ML
INJECTION, SOLUTION INTRAVENOUS CONTINUOUS
Status: DISCONTINUED | OUTPATIENT
Start: 2023-04-14 | End: 2023-04-14

## 2023-04-14 RX ORDER — SODIUM CHLORIDE 0.9 % (FLUSH) 0.9 %
5-40 SYRINGE (ML) INJECTION EVERY 12 HOURS SCHEDULED
Status: DISCONTINUED | OUTPATIENT
Start: 2023-04-14 | End: 2023-04-14 | Stop reason: SDUPTHER

## 2023-04-14 RX ADMIN — OXYCODONE AND ACETAMINOPHEN 2 TABLET: 7.5; 325 TABLET ORAL at 20:39

## 2023-04-14 RX ADMIN — SODIUM CHLORIDE, PRESERVATIVE FREE 10 ML: 5 INJECTION INTRAVENOUS at 20:39

## 2023-04-14 RX ADMIN — CEFAZOLIN SODIUM 2000 MG: 100 INJECTION, POWDER, LYOPHILIZED, FOR SOLUTION INTRAVENOUS at 20:39

## 2023-04-14 RX ADMIN — OXYCODONE 10 MG: 5 TABLET ORAL at 16:14

## 2023-04-14 RX ADMIN — MIDAZOLAM 1 MG: 1 INJECTION INTRAMUSCULAR; INTRAVENOUS at 11:32

## 2023-04-14 RX ADMIN — SODIUM CHLORIDE, SODIUM LACTATE, POTASSIUM CHLORIDE, AND CALCIUM CHLORIDE: 600; 310; 30; 20 INJECTION, SOLUTION INTRAVENOUS at 10:20

## 2023-04-14 RX ADMIN — ONDANSETRON 4 MG: 2 INJECTION INTRAMUSCULAR; INTRAVENOUS at 16:14

## 2023-04-14 RX ADMIN — ACETAMINOPHEN 1000 MG: 500 TABLET, FILM COATED ORAL at 10:09

## 2023-04-14 RX ADMIN — ASPIRIN 325 MG: 325 TABLET, COATED ORAL at 20:40

## 2023-04-14 RX ADMIN — Medication 3 AMPULE: at 10:10

## 2023-04-14 RX ADMIN — FENTANYL CITRATE 50 MCG: 50 INJECTION INTRAMUSCULAR; INTRAVENOUS at 11:31

## 2023-04-14 ASSESSMENT — PAIN DESCRIPTION - DESCRIPTORS
DESCRIPTORS: ACHING

## 2023-04-14 ASSESSMENT — PAIN DESCRIPTION - ORIENTATION
ORIENTATION: LEFT
ORIENTATION: LEFT
ORIENTATION: RIGHT

## 2023-04-14 ASSESSMENT — PAIN DESCRIPTION - PAIN TYPE: TYPE: SURGICAL PAIN

## 2023-04-14 ASSESSMENT — PAIN - FUNCTIONAL ASSESSMENT: PAIN_FUNCTIONAL_ASSESSMENT: 0-10

## 2023-04-14 ASSESSMENT — PAIN DESCRIPTION - ONSET: ONSET: GRADUAL

## 2023-04-14 ASSESSMENT — PAIN DESCRIPTION - FREQUENCY: FREQUENCY: INTERMITTENT

## 2023-04-14 ASSESSMENT — PAIN DESCRIPTION - LOCATION
LOCATION: SHOULDER

## 2023-04-14 ASSESSMENT — PAIN SCALES - GENERAL
PAINLEVEL_OUTOF10: 0
PAINLEVEL_OUTOF10: 6
PAINLEVEL_OUTOF10: 7
PAINLEVEL_OUTOF10: 2

## 2023-04-14 NOTE — RT PROTOCOL NOTE
Respiratory Care Services     Policy Number: [id number]    Title: Oxygen Protocol    Effective Date: 01/1996    Revised Date: 06/2013, 02/29/2016, 4/2018, 7/2019    Reviewed Date: 05/2014, 03/2015, 06/2017, 11/2020        I. Policy: The Oxygen Protocol will be initiated for all patients upon written order from physician for administration of oxygen therapy or if a patient is found to have an oxygen saturation of 88% or less. Special consideration: the goal of oxygen therapy for COPD patients is to maintain oxygen saturation between 88% - 92% to comply with GOLD Guidelines. II. Purpose: To provide protocol driven respiratory therapy for the administration of oxygen at concentrations greater than that in ambient air with the intent of treating or preventing the symptoms and manifestations of hypoxia. III. Responsibility: Director Respiratory Care Services, all Respiratory Care Practitioners     IV. Indications:   Implement this protocol for patients when physician orders oxygen to be administered or when patient is found to have an oxygen saturation of 88% or less. To assure routine monitoring of patient's oxygen saturation b.i.d. and to make appropriate adjustments in accordance with ordered oxygen saturation parameters. To assure continuity of respiratory care that meets [facility] Clinical Practice Guidelines and GOLD Guidelines. Hb < 8  Sickle Cell anemia crisis    V. Assessment:  Assess the following parameters to determine the need to adjust oxygen:  Measurement of patient's oxygen saturation via pulse oximetry. Observation of patient's color, respiratory effort, and responsiveness. Measurement of heart rate and respiratory rate. Complete a three-step ambulatory oxygen saturation when ordered. VI. Initiation:  Upon receipt of an order for oxygen, the RCP will:   Verify order in the patient's EMR, which should include the desired oxygen saturation to be maintained.   The patient shall be placed on

## 2023-04-14 NOTE — H&P
nodes.  CONCLUSION: Advanced glenohumeral erosive osteoarthrosis as described. Has almost 21 degrees of retroversion. On Equinox planning natali would be an 8 degree augmented baseplate with 42 glenosphere which would give at least 12 mm of lateralization and approximately 24 mm of lengthening. Lab Results   Component Value Date    WBC 9.7 04/05/2023    HGB 15.3 04/05/2023    HCT 44.1 04/05/2023    MCV 95.0 04/05/2023     04/05/2023     Lab Results   Component Value Date/Time     04/05/2023 08:25 AM    K 4.0 04/05/2023 08:25 AM     04/05/2023 08:25 AM    CO2 31 04/05/2023 08:25 AM    BUN 20 04/05/2023 08:25 AM    CREATININE 1.06 04/05/2023 08:25 AM    GLUCOSE 86 04/05/2023 08:25 AM    CALCIUM 9.0 04/05/2023 08:25 AM            A/Plan:     ICD-10-CM    1. Primary osteoarthritis of right shoulder  M19.011 Hemoglobin A1c    Added automatically from request for surgery [id number]          I had a long discussion with the patient regarding the natural history of the problem, as well as treatment options. Treatment:   The patient desires a  right reverse total shoulder replacement and possible biceps subpectoral tenodesis, after they have exhausted all conservative options. I talked with them extensively about the risks, benefits, reasonable expectations and expected recovery time as well as possible complications including but not limited to bleeding, infection, wear of the components requiring revision, neurovascular injury, instability/dislocations, cosmetic deformity, stiffness, pain, continued problems, DVT, PE, hardware failure,  fracture, heterotopic ossification, MI and other anesthesia related risks, etc.   In the office I gave them education literature and answered their questions. They seem to understand and wish to proceed. The patient was counseled at length about the risks of susan Covid-19 during their perioperative period and any recovery window from their procedure.

## 2023-04-14 NOTE — OP NOTE
allowing good motion, with no evidence of instability. The final implant stem, baseplate and poly component were then fixed together on the back table. A small amount of bone graft from the humeral head was placed at the calcar. Vancomycin powder was placed into the humeral shaft. The implant was correctly placed in the humerus with appropriate retroversion. The shoulder was reduced and it was felt an appropriate tension was obtained at this point while still maintaining good range of motion of the shoulder without impinging anywhere. The wound was irrigated well. The soft tissues were injected with a mixture of epi and Naropin along the posterior capsule and along the subcutaneous tissue. The interval was loosely approximated with 2-0 ethibond and then Txa was injected into the empty space. The subcutaneous tissue was closed with 2-0 Monocryl, skin was closed with subcuticular 2-0 monocryl. Derma bond and a sterile dressing was applied on the shoulder. The patient was put in a slight abduction sling and returned to the recovery room in satisfactory condition. There were no known intraoperative complications. All counts were correct. Post-operative plan: Will begin reverse TSA post op protocol. Estimated Blood Loss:   175 ml    Fluids:    see anesthesia notes. Implant:   Implant Name Type Inv.  Item Serial No.  Lot No. LRB No. Used Action   PLATE MATIAS 8DEG RT SHLDR POST AUG RVS EQUINOXE - [id number]  PLATE MATIAS 8DEG RT SHLDR POST AUG RVS EQUINOXE [id number] [facility]  Right 1 Implanted   KIT BONE SCR L38MM DIA4.5MM MATIAS SHLDR GRN COMPR LCK CAP RVS - [id number]  KIT BONE SCR L38MM DIA4.5MM MATIAS SHLDR GRN COMPR LCK CAP RVS [id number] [employer]  Right 1 Implanted   KIT BONE SCR L38MM DIA4.5MM MATIAS SHLDR GRN COMPR LCK CAP RVS - [id number]  KIT BONE SCR L38MM DIA4.5MM MATIAS SHLDR GRN COMPR LCK CAP RVS [id number] [employer]-  Right 1 Implanted   KIT BNE SCR L22MM DIA4.5MM MATIAS SHLDR BLK

## 2023-04-14 NOTE — DISCHARGE INSTRUCTIONS
Total Shoulder Replacement Postoperative Instructions    Returning Home    Care of your incisions: You have absorbable stitches which do not need to be removed. Your incision will be checked at your first visit. Moderate bleeding may occur at the incision sites. This should decrease quickly over time. Leave the dressings in place for 5-7 days. Then dressings may be removed and replaced with fresh gauze if there is any drainage. You may bath or shower but the incisions must be kept clean and dry. You may take your arm out of the sling for showering or bathing but being careful to avoid use of the arm. You may let your arm also hang at your side during showering or at your side during bathing. Watch the wound for increasing redness, tenderness, swelling and pus drainage daily. These can be early signs of infection. Please communicate any concerns. A slight temperature the first few days after surgery is not uncommon. This can be treated with deep breathing and coughing to clear the lungs. However, fevers (anything over 101°F), increasing pain, and swelling at the incisions should be reported immediately. Keep the wounds dry until your first follow-up visit. It is fine to loosen the sling and do elbow straightening and bending with the arm at the side. It is also good to move the wrist and hand. This can be done as much as you desire, but at least three times a day. You are to wear sling at all times except when doing the exercises as above and showers. Deep vein thrombosis (DVT) is a condition in which a blood clot has formed in a deep vein of the leg or arm. This may result in redness, swelling, warmth and/or pain of the leg/arm. Although these are very rare, there are things that can be done to lessen the risk.   It is recommended by your surgeon unless indicated otherwise, after arthroscopy; take an adult aspirin once a day for three weeks after surgery to help prevent the formation of

## 2023-04-14 NOTE — PERIOP NOTE
TRANSFER - OUT REPORT:    Verbal report given to [facility] RN on [employer]  being transferred to [facility] for routine post-op       Report consisted of patient's Situation, Background, Assessment and   Recommendations(SBAR). Information from the following report(s) Nurse Handoff Report was reviewed with the receiving nurse. Pep Assessment: No data recorded  Lines:   Peripheral IV 04/14/23 Left Hand (Active)   Site Assessment Clean, dry & intact 04/14/23 1605   Line Status Infusing 04/14/23 1605   Phlebitis Assessment No symptoms 04/14/23 1605   Infiltration Assessment 0 04/14/23 1605   Dressing Status Clean, dry & intact 04/14/23 1605   Dressing Type Transparent 04/14/23 1605        Opportunity for questions and clarification was provided.       Patient transported with:  O2 @ 3lpm

## 2023-04-15 VITALS
OXYGEN SATURATION: 94 % | HEART RATE: 84 BPM | BODY MASS INDEX: 26.25 KG/M2 | SYSTOLIC BLOOD PRESSURE: 93 MMHG | WEIGHT: 188.2 LBS | DIASTOLIC BLOOD PRESSURE: 65 MMHG | RESPIRATION RATE: 17 BRPM | TEMPERATURE: 98.6 F

## 2023-04-15 LAB
HCT VFR BLD AUTO: 36.2 % (ref 41.1–50.3)
HGB BLD-MCNC: 12.2 G/DL (ref 13.6–17.2)

## 2023-04-15 PROCEDURE — 97161 PT EVAL LOW COMPLEX 20 MIN: CPT

## 2023-04-15 PROCEDURE — 6360000002 HC RX W HCPCS: Performed by: PHYSICIAN ASSISTANT

## 2023-04-15 PROCEDURE — 97165 OT EVAL LOW COMPLEX 30 MIN: CPT

## 2023-04-15 PROCEDURE — 94760 N-INVAS EAR/PLS OXIMETRY 1: CPT

## 2023-04-15 PROCEDURE — 85014 HEMATOCRIT: CPT

## 2023-04-15 PROCEDURE — 97530 THERAPEUTIC ACTIVITIES: CPT

## 2023-04-15 PROCEDURE — 6370000000 HC RX 637 (ALT 250 FOR IP): Performed by: ORTHOPAEDIC SURGERY

## 2023-04-15 PROCEDURE — 6370000000 HC RX 637 (ALT 250 FOR IP): Performed by: PHYSICIAN ASSISTANT

## 2023-04-15 PROCEDURE — 2580000003 HC RX 258: Performed by: PHYSICIAN ASSISTANT

## 2023-04-15 PROCEDURE — 97535 SELF CARE MNGMENT TRAINING: CPT

## 2023-04-15 PROCEDURE — 36415 COLL VENOUS BLD VENIPUNCTURE: CPT

## 2023-04-15 RX ADMIN — CEFAZOLIN SODIUM 2000 MG: 100 INJECTION, POWDER, LYOPHILIZED, FOR SOLUTION INTRAVENOUS at 05:39

## 2023-04-15 RX ADMIN — OXYCODONE AND ACETAMINOPHEN 2 TABLET: 7.5; 325 TABLET ORAL at 07:06

## 2023-04-15 RX ADMIN — OXYCODONE AND ACETAMINOPHEN 2 TABLET: 7.5; 325 TABLET ORAL at 01:16

## 2023-04-15 RX ADMIN — SODIUM CHLORIDE, PRESERVATIVE FREE 10 ML: 5 INJECTION INTRAVENOUS at 08:32

## 2023-04-15 RX ADMIN — ASPIRIN 325 MG: 325 TABLET, COATED ORAL at 08:32

## 2023-04-15 RX ADMIN — NALOXEGOL OXALATE 25 MG: 25 TABLET, FILM COATED ORAL at 08:32

## 2023-04-15 RX ADMIN — LEVOTHYROXINE SODIUM 100 MCG: 0.1 TABLET ORAL at 05:51

## 2023-04-15 ASSESSMENT — PAIN DESCRIPTION - ORIENTATION
ORIENTATION: RIGHT
ORIENTATION: RIGHT

## 2023-04-15 ASSESSMENT — PAIN SCALES - GENERAL
PAINLEVEL_OUTOF10: 4
PAINLEVEL_OUTOF10: 5
PAINLEVEL_OUTOF10: 6

## 2023-04-15 ASSESSMENT — PAIN DESCRIPTION - DESCRIPTORS
DESCRIPTORS: ACHING
DESCRIPTORS: ACHING

## 2023-04-15 ASSESSMENT — PAIN DESCRIPTION - LOCATION
LOCATION: SHOULDER
LOCATION: SHOULDER

## 2023-04-15 NOTE — PROGRESS NOTES
Pt demonstrates good effort with IS. Placed on heated high flow for sleep. Placed on pulse ox continuous for sleep and safety. Karen Cisneros

## 2023-04-15 NOTE — PROGRESS NOTES
[facility]        April 15, 2023         Post Op day: 1 Day Post-Op Procedure(s) (LRB):  RIGHT SHOULDER TOTAL ARTHROPLASTY REVERSE -REGIONAL BLOCK -23 HOUR OBSERVATION (Right)      Admit Date: 2023  Admit Diagnosis: Bilateral shoulder pain, unspecified chronicity [M25.511, M25.512]  Primary osteoarthritis of right shoulder [M19.011]  Arthritis of right shoulder region [M19.011]       Principle Problem: Rotator cuff tear arthropathy of right shoulder. Subjective: Doing well, No complaints, No SOB, No Chest Pain, No Nausea or Vomiting  Patient sitting on side of bed  Reports pain is controlled  Therapy at bedside working with patient     Objective:   Vital Signs are Stable, No Acute Distress, Alert,  Dressing is Dry,  Neurovascular exam is normal.     Assessment / Plan :  Patient Active Problem List   Diagnosis    Abscess of back    AC joint arthropathy    Anxiety associated with depression    Atypical pigmented skin lesion    Chronic pain of both shoulders    Chronic right-sided low back pain    Elevated PSA    Erectile dysfunction    Hyperlipidemia    Hypothyroidism    Left rotator cuff tear arthropathy    Localized, primary osteoarthritis of shoulder region    Peyronie disease    Rash and other nonspecific skin eruption    SK (seborrheic keratosis)    Smoker    Primary insomnia    Stage 2 chronic kidney disease    Primary osteoarthritis of right shoulder    Rotator cuff tear arthropathy of right shoulder    Arthritis of right shoulder region    Patient Vitals for the past 8 hrs:   BP Temp Temp src Pulse Resp SpO2   04/15/23 0810 -- -- -- 84 -- 94 %   04/15/23 0738 93/65 98.6 °F (37 °C) Oral 82 17 92 %   04/15/23 0736 -- -- -- -- 17 --   04/15/23 0407 -- -- -- 77 16 97 %   04/15/23 0237 97/67 97.9 °F (36.6 °C) Oral 75 16 97 %    Temp (24hrs), Av °F (36.7 °C), Min:97.7 °F (36.5 °C), Max:98.6 °F (37 °C)    Body mass index is 26.25 kg/m².     Lab Results   Component Value Date/Time    HGB

## 2023-04-15 NOTE — PROGRESS NOTES
ACUTE PHYSICAL THERAPY GOALS:   (Developed with and agreed upon by patient and/or caregiver.)  GOALS (1-4 days):  (1.)  Patient will move from supine to sit and sit to supine  in bed with INDEPENDENT. (2.)  Patient will transfer from bed to chair and chair to bed with INDEPENDENT using the least restrictive device. (3.)  Patient will ambulate with INDEPENDENT for 650 feet with the least restrictive device. 4.  Patient will ambulate up/down 3 steps without a rail independently. (5.)  Patient will be independent with shoulder HEP to increase range of motion per MD orders. ________________________________________________________________________________________________      PHYSICAL THERAPY: SHOULDER Initial Assessment and AM  (Link to Caseload Tracking: PT Visit Days : 1  Acknowledge Orders Time In/Out  PT Charge Capture  Rehab Caseload Tracker    Ji Syed is a 69 y.o. male   PRIMARY DIAGNOSIS: Rotator cuff tear arthropathy of right shoulder  Bilateral shoulder pain, unspecified chronicity [M25.511, M25.512]  Primary osteoarthritis of right shoulder [M19.011]  Arthritis of right shoulder region [M19.011]  Procedure(s) (LRB):  RIGHT SHOULDER TOTAL ARTHROPLASTY REVERSE -REGIONAL BLOCK -23 HOUR OBSERVATION (Right)  1 Day Post-Op  Reason for Referral: Pain in Right Shoulder (M25.511)  Stiffness of Right Shoulder, Not elsewhere classified (M25.611)  Outpatient in a bed: Payor: [facility] MEDICARE / Plan: Janis Lan / Product Type: *No Product type* /     MOVEMENT PRECAUTIONS    reverse TSA post op protocol     REHAB RECOMMENDATIONS:   Recommendation to date pending progress:  Setting:  OPPT when cleared by MD    Equipment:    None     ASSESSMENT:   ASSESSMENT:  Mr. Tangela Vasquez presents s/p R rev TSA. Patient demonstrates decreased R UE strength and ROM and decreased independence with his HEP. He would benefit from therapy to address these deficits prior to d/c home.   He lives alone but will

## 2023-04-15 NOTE — PROGRESS NOTES
OCCUPATIONAL THERAPY Initial Assessment, Daily Note, Discharge, and AM      (Link to Caseload Tracking: OT Visit Days: 1  OT Orders   Time  OT Charge Capture  Rehab Caseload Tracker  Episode     Diego Blake is a 69 y.o. male   PRIMARY DIAGNOSIS: Rotator cuff tear arthropathy of right shoulder  Bilateral shoulder pain, unspecified chronicity [M25.511, M25.512]  Primary osteoarthritis of right shoulder [M19.011]  Arthritis of right shoulder region [M19.011]  Procedure(s) (LRB):  RIGHT SHOULDER TOTAL ARTHROPLASTY REVERSE -REGIONAL BLOCK -23 HOUR OBSERVATION (Right)  1 Day Post-Op  Reason for Referral: Pain in Right Shoulder (M25.511)  Stiffness of Right Shoulder, Not elsewhere classified (M25.611)  Outpatient in a bed: Payor: [facility] MEDICARE / Plan: [employer] / Product Type: *No Product type* /     ASSESSMENT:     REHAB RECOMMENDATIONS:   Recommendation to date pending progress:  Setting:  Outpatient Therapy per protocol     Equipment:    None     ASSESSMENT:  Mr. Justine Garcia is s/p right rev TSA and presents with decreased independence with functional mobility and activities of daily living as compared to baseline level of function and safety. Patient would benefit from skilled Occupational Therapy to maximize independence and safety with self-care task and functional mobility. Mr. Justine Garcia was able to perform toileting, grooming, bathing, and dressing with assistance as charted below. He performed functional household mobility and transfers with R arm in ultrasling and IND. He plans to have support from brother and sister-in-law once home. Should progress well with continued therapy, per protocol, upon discharge. Discharge acute OT as all goals have been met.        [address]\"" [address] [address] AM-PAC 6 Clicks Daily Activity Inpatient Short Form:    AM-PAC Daily Activity - Inpatient   How much help is needed for putting on and taking off regular lower body clothing?: A Lot  How much help is needed

## 2023-04-15 NOTE — CARE COORDINATION
69 yr-old M with TRSA with Dr. Ramo Gonzalez. Will have help from his brother at home. Will f/u with Dr. Ramo Gonzalez for any outpatient PT.       04/15/23 7958   Service Assessment   Patient Orientation Alert and Oriented   Cognition Alert   History Provided By Patient   Primary [facility]  Family Members   Patient's Healthcare Decision Maker is: Legal Next of Yadi Garrido   PCP Verified by CM Yes   Last Visit to PCP Within last 3 months   Prior Functional Level Independent in ADLs/IADLs   Current Functional Level Independent in ADLs/IADLs   Ability to make needs known: Good   Family able to assist with home care needs: Yes   Would you like for me to discuss the discharge plan with any other family members/significant others, and if so, who?  No   Financial Resources [employer] Other (Comment)   Social/Functional History   Lives With Alone   Type of [address] One level   Home Access Stairs to enter without rails   Entrance Stairs - Number of Steps 3

## 2023-04-15 NOTE — DISCHARGE SUMMARY
(ZOFRAN-ODT) 8 MG TBDP disintegrating tablet Place 0.5 tablets under the tongue in the morning and 0.5 tablets at noon and 0.5 tablets in the evening and 0.5 tablets before bedtime. Take 20 minutes prior to pain medication for nausea prevention. Qty: 16 tablet, Refills: 0      cetirizine-psuedoephedrine (ZYRTEC-D) 5-120 MG per extended release tablet Take 1 tablet by mouth 2 times daily as needed for Allergies      Coenzyme Q10 (COQ10) 100 MG CAPS Take by mouth daily      Misc Natural Products ([employer] PROSTATE MAX +) TABS Take 2 tablets by mouth daily      levothyroxine (SYNTHROID) 100 MCG tablet Take 1 tablet by mouth Daily      ibuprofen (ADVIL;MOTRIN) 200 MG tablet Take 3 tablets by mouth 2 times daily      ALPRAZolam (XANAX) 0.5 MG tablet TAKE 1 TABLET (0.5 MG) BY MOUTH 2 (TWO) TIMES A DAY AS NEEDED FOR ANXIETY      diclofenac sodium (VOLTAREN) 1 % GEL 2 times daily as needed             Diet: Reference my discharge instructions. Activity: Reference my discharge instructions. No follow-ups on file. Total time discharging patient took greater than 30 minutes.     DELMY Smith  April 15, 2023  10:30 AM

## 2023-04-25 ENCOUNTER — OFFICE VISIT (OUTPATIENT)
Dept: ORTHOPEDIC SURGERY | Age: 68
End: 2023-04-25

## 2023-04-25 DIAGNOSIS — Z09 SURGERY FOLLOW-UP: ICD-10-CM

## 2023-04-25 DIAGNOSIS — M19.011 PRIMARY OSTEOARTHRITIS OF RIGHT SHOULDER: Primary | ICD-10-CM

## 2023-04-25 DIAGNOSIS — Z96.611 S/P REVERSE TOTAL SHOULDER ARTHROPLASTY, RIGHT: ICD-10-CM

## 2023-04-25 PROCEDURE — 99024 POSTOP FOLLOW-UP VISIT: CPT | Performed by: ORTHOPAEDIC SURGERY

## 2023-05-11 ENCOUNTER — OFFICE VISIT (OUTPATIENT)
Dept: ORTHOPEDIC SURGERY | Age: 68
End: 2023-05-11

## 2023-05-11 DIAGNOSIS — Z96.611 S/P REVERSE TOTAL SHOULDER ARTHROPLASTY, RIGHT: ICD-10-CM

## 2023-05-11 DIAGNOSIS — Z09 SURGERY FOLLOW-UP: Primary | ICD-10-CM

## 2023-05-11 PROCEDURE — 99024 POSTOP FOLLOW-UP VISIT: CPT | Performed by: PHYSICIAN ASSISTANT

## 2023-05-11 NOTE — PROGRESS NOTES
Name: Dante Ruff  YOB: 1955  Gender: male  MRN: [id number]    CC:   Chief Complaint   Patient presents with    Follow-up     Recheck s/p right RTSA  DOS 4/14/23   The patient comes in today 4 weeks status post rightTSA. Right Shoulder Total Arthroplasty Reverse -regional Block -23 Hour Observation - Right  4/14/2023    HPI: The patient is doing well today. Their pain has decreased. They were held from physical therapy due to noncompliance with the sling. He states that he has been using it since his past visit. They feel as if they are progressing as expected. They deny use of narcotic pain medications. Review of Systems  Noncontributory    PE right shoulder : Their wounds are clean, dry, and intact and there is no sign of infection. They are neurovascularly intact. Their deltoid is firing well. X-rayWillian Sabino and axillary lateral views of the operativeright shoulder were obtained and reviewed today in the office. There has been no change in the hardware's alignment and the glenohumeral position is appropriate on all the films. AP:     ICD-10-CM    1. Surgery follow-up  Z09 XR SHOULDER RIGHT (MIN 2 VIEWS)     Ambulatory referral to Physical Therapy      2. S/P reverse total shoulder arthroplasty, right  Z96.611 Ambulatory referral to Physical Therapy        The patient is doing well status post the above mentioned procedure. They need to continue with Physical Therapy per the protocol. They will follow-up with us in clinic in 4-6 weeks for repeat evaluation. Recommend therapy to evaluate and treat current complaints and pathology. A home exercise program was also discussed with the patient. No follow-ups on file.      Cathie Campbell  05/11/23

## 2023-05-12 ENCOUNTER — HOSPITAL ENCOUNTER (OUTPATIENT)
Dept: PHYSICAL THERAPY | Age: 68
Setting detail: RECURRING SERIES
Discharge: HOME | End: 2023-05-15
Payer: MEDICARE

## 2023-05-12 PROCEDURE — 97162 PT EVAL MOD COMPLEX 30 MIN: CPT

## 2023-05-12 ASSESSMENT — PAIN SCALES - GENERAL: PAINLEVEL_OUTOF10: 0

## 2023-05-15 ENCOUNTER — HOSPITAL ENCOUNTER (OUTPATIENT)
Dept: PHYSICAL THERAPY | Age: 68
Setting detail: RECURRING SERIES
Discharge: HOME | End: 2023-05-18
Payer: MEDICARE

## 2023-05-15 PROCEDURE — 97110 THERAPEUTIC EXERCISES: CPT

## 2023-05-15 PROCEDURE — 97140 MANUAL THERAPY 1/> REGIONS: CPT

## 2023-05-15 ASSESSMENT — PAIN SCALES - GENERAL: PAINLEVEL_OUTOF10: 0

## 2023-05-15 NOTE — PROGRESS NOTES
R shoulder flexion 145 degrees, R shoulder abduction 140 degrees ( all PROM in supine)  Treatment   THERAPEUTIC EXERCISE: ( 10 minutes):    Exercises per grid below to improve mobility and strength. Required minimal verbal cues to promote proper body alignment, promote proper body posture, and promote proper body mechanics. Progressed resistance, range, repetitions, and complexity of movement as indicated. Date:  5/12/23 Date:  5/15/23 Date:     Activity/Exercise Parameters Parameters Parameters   Pendulums  X 20 circles each direction    Scapular retraction sitting X 10 reps, HEP 2 x 10 reps B    Hand/wrist/elbow AROM R UE  HEP    Supine R shoulder ER with wand X 10 reps , HEP, emphasized to pt to work gently through ROM and to avoid pushing the motion X 15 reps with cuing for positioning R UE    Supine forward elevation with wand  X 10 reps (modified to not elevate L UE which has OA also) added to HEP                      HEP Continue HEP from acute care PT and add above as tolerated; ice x 10 minutes after exercising; Pt education Educated pt to not push motion and that PT will guide pt through the steps in progressing R UE ROM and strength         MANUAL THERAPY: (30 minutes):   Joint mobilization and Soft tissue mobilization was utilized and necessary because of the patient's restricted joint motion and restricted motion of soft tissue. In supine, ROM R shoulder in flexion, abduction, external rotation as tolerated. Also soft tissue mobilization and gentle scar mobilization R shoulder to increase skin mobility for ROM. MODALITIES:       *  Cold Pack Therapy in order to provide analgesia and reduce inflammation and edema. Game ready ice machine to R shoulder x 10 minutes at end of session to reduce inflammation. Treatment/Session Summary:    >Treatment Assessment:   Patient tolerated session well without complaints.  Modified R shoulder forward elevation with wand in supine to not aggravate L

## 2023-05-17 ENCOUNTER — HOSPITAL ENCOUNTER (OUTPATIENT)
Dept: PHYSICAL THERAPY | Age: 68
Setting detail: RECURRING SERIES
Discharge: HOME | End: 2023-05-20
Payer: MEDICARE

## 2023-05-17 PROCEDURE — 97140 MANUAL THERAPY 1/> REGIONS: CPT

## 2023-05-17 PROCEDURE — 97110 THERAPEUTIC EXERCISES: CPT

## 2023-05-17 ASSESSMENT — PAIN SCALES - GENERAL: PAINLEVEL_OUTOF10: 0

## 2023-05-17 NOTE — PROGRESS NOTES
Yamilex Matta  : 1955  Primary:  (No info available)  Secondary:  [address] [facility] Therapy [address] [address]  Phone: [phone]  Fax: [phone] Plan Frequency: 2 times per week for 12 weeks  Plan of Care/Certification Expiration Date: 08/10/23      >PT Visit Info:   Plan Frequency: 2 times per week for 12 weeks  Plan of Care/Certification Expiration Date: 08/10/23  Total # of Visits to Date: 3  Progress Note Counter: 3  Progress Note Due Date: 23      Visit Count:  3    OUTPATIENT PHYSICAL THERAPY:OP NOTE TYPE: Treatment Note 2023       Episode  }Appt Desk             Treatment Diagnosis:  Pain in Right Shoulder (M25.511)  Stiffness of Right Shoulder, Not elsewhere classified (M25.611)  Medical/Referring Diagnosis:  Surgery follow-up [Z09]  S/P reverse total shoulder arthroplasty, right [I85.025]  Referring Physician:  DELMY Rollins MD Orders:  PT Eval and Treat   Surgery: Right reverse TSA  Surgery Date: 23  Schedule Instructions: start therapy within a week of 23  Frequency: 2 times a week for 12  weeks  Treatment & Precautions: Evaluation & Treatment per protocol  Preferred Facility/Location: Pt Preference  Date of Onset:  Onset Date: 23     Allergies:   Patient has no known allergies. Restrictions/Precautions:  Restrictions/Precautions: Weight Bearing  Right Upper Extremity Weight Bearing: Non Weight Bearing     Interventions Planned (Treatment may consist of any combination of the following):    Current Treatment Recommendations: Strengthening; ROM; ADL/Self-care training; Manual; Neuromuscular re-education; Home exercise program; Patient/Caregiver education & training; Modalities; Positioning; Therapeutic activities     >Subjective Comments:  Pt states his shoulder is feeling good today.   >Initial:     0/10>Post Session:       0/10  Medications Last Reviewed:  2023  Updated Objective Findings:  None

## 2023-05-23 ENCOUNTER — HOSPITAL ENCOUNTER (OUTPATIENT)
Dept: PHYSICAL THERAPY | Age: 68
Setting detail: RECURRING SERIES
Discharge: HOME | End: 2023-05-26
Payer: MEDICARE

## 2023-05-23 PROCEDURE — 97140 MANUAL THERAPY 1/> REGIONS: CPT

## 2023-05-23 PROCEDURE — 97110 THERAPEUTIC EXERCISES: CPT

## 2023-05-23 NOTE — PROGRESS NOTES
Camden Moise  : 1955  Primary:  (No info available)  Secondary:  [facility]  [place] [address]  Phone: [phone]  Fax: [phone] Plan Frequency: 2 times per week for 12 weeks  Plan of Care/Certification Expiration Date: 08/10/23      >PT Visit Info:   Plan Frequency: 2 times per week for 12 weeks  Plan of Care/Certification Expiration Date: 08/10/23  Total # of Visits to Date: 4  Progress Note Counter: 4  Progress Note Due Date: 23      Visit Count:  4    OUTPATIENT PHYSICAL THERAPY:OP NOTE TYPE: Treatment Note 2023       Episode  }Appt Desk             Treatment Diagnosis:  Pain in Right Shoulder (M25.511)  Stiffness of Right Shoulder, Not elsewhere classified (M25.611)  Medical/Referring Diagnosis:  Surgery follow-up [Z09]  S/P reverse total shoulder arthroplasty, right [V13.886]  Referring Physician:  DELMY Ricks MD Orders:  PT Eval and Treat   Surgery: Right reverse TSA  Surgery Date: 23  Schedule Instructions: start therapy within a week of 23  Frequency: 2 times a week for 12  weeks  Treatment & Precautions: Evaluation & Treatment per protocol  Preferred Facility/Location: Pt Preference  Date of Onset:  Onset Date: 23     Allergies:   Patient has no known allergies. Restrictions/Precautions:  Restrictions/Precautions: Weight Bearing  Right Upper Extremity Weight Bearing: Non Weight Bearing     Interventions Planned (Treatment may consist of any combination of the following):    Current Treatment Recommendations: Strengthening; ROM; ADL/Self-care training; Manual; Neuromuscular re-education; Home exercise program; Patient/Caregiver education & training; Modalities; Positioning; Therapeutic activities     >Subjective Comments:  I am doing ok, I see the MD Thursday.      >Initial:    3  /10>Post Session:    3    /10  Medications Last Reviewed:  2023  Updated Objective Findings:  None

## 2023-05-25 ENCOUNTER — OFFICE VISIT (OUTPATIENT)
Dept: ORTHOPEDIC SURGERY | Age: 68
End: 2023-05-25

## 2023-05-25 DIAGNOSIS — Z96.611 S/P REVERSE TOTAL SHOULDER ARTHROPLASTY, RIGHT: ICD-10-CM

## 2023-05-25 DIAGNOSIS — Z09 SURGERY FOLLOW-UP: Primary | ICD-10-CM

## 2023-05-25 PROCEDURE — 99024 POSTOP FOLLOW-UP VISIT: CPT | Performed by: PHYSICIAN ASSISTANT

## 2023-05-26 ENCOUNTER — HOSPITAL ENCOUNTER (OUTPATIENT)
Dept: PHYSICAL THERAPY | Age: 68
Setting detail: RECURRING SERIES
Discharge: HOME | End: 2023-05-29
Payer: MEDICARE

## 2023-05-26 PROCEDURE — 97140 MANUAL THERAPY 1/> REGIONS: CPT

## 2023-05-26 PROCEDURE — 97110 THERAPEUTIC EXERCISES: CPT

## 2023-05-26 ASSESSMENT — PAIN SCALES - GENERAL: PAINLEVEL_OUTOF10: 2

## 2023-05-29 NOTE — PROGRESS NOTES
Name: Rodolfo Tavarez  YOB: 1955  Gender: male  MRN: [id number]    CC:   Chief Complaint   Patient presents with    Follow-up     Recheck s/p right RTSA   The patient comes in today 6 weeks status post rightTSA. Right Shoulder Total Arthroplasty Reverse -regional Block -23 Hour Observation - Right  4/14/2023    HPI: The patient is doing well today. Their pain has decreased. They are attending physical therapy and are following the protocol. They feel as if they are progressing as expected. They deny use of narcotic pain medications. The patient was seen two weeks ago so new radiographs are not needed today. Review of Systems  Noncontributory    PE right shoulder : Their wounds are clean, dry, and intact and there is no sign of infection. They are neurovascularly intact. Their deltoid is firing well. Their Passive Shoulder Range of Motion is: Forward elevation: 160  Abduction: 110  External Rotation: 25        AP:     ICD-10-CM    1. Surgery follow-up  Z09       2. S/P reverse total shoulder arthroplasty, right  Z96.611         The patient is doing well status post the above mentioned procedure. They need to continue with Physical Therapy per the protocol. They will follow-up with us in clinic in 4-6 weeks for repeat evaluation. Patient is interested in proceeding with his contralateral shoulder. Discussed the importance of gradual improvement and time between surgeries. No follow-ups on file.      Cathie Bobo  05/29/23

## 2023-05-31 ENCOUNTER — HOSPITAL ENCOUNTER (OUTPATIENT)
Dept: PHYSICAL THERAPY | Age: 68
Setting detail: RECURRING SERIES
Discharge: HOME | End: 2023-06-03
Payer: MEDICARE

## 2023-05-31 PROCEDURE — 97110 THERAPEUTIC EXERCISES: CPT

## 2023-05-31 PROCEDURE — 97140 MANUAL THERAPY 1/> REGIONS: CPT

## 2023-05-31 ASSESSMENT — PAIN SCALES - GENERAL: PAINLEVEL_OUTOF10: 2

## 2023-05-31 NOTE — PROGRESS NOTES
Yuniel Quispe  : 1955  Primary:  (No info available)  Secondary:  [address] [facility] Therapy [address] [address]  Phone: [phone]  Fax: [phone] Plan Frequency: 2 times per week for 12 weeks  Plan of Care/Certification Expiration Date: 08/10/23      >PT Visit Info:   Plan Frequency: 2 times per week for 12 weeks  Plan of Care/Certification Expiration Date: 08/10/23  Total # of Visits to Date: 6  Progress Note Counter: 6  Progress Note Due Date: 23      Visit Count:  6 6/ is last approved visit (8th)   OUTPATIENT PHYSICAL THERAPY:OP NOTE TYPE: Treatment Note 2023       Episode  }Appt Desk             Treatment Diagnosis:   Pain in Right Shoulder (M25.511)  Stiffness of Right Shoulder, Not elsewhere classified (M25.611)  Medical/Referring Diagnosis:   Surgery follow-up [Z09]  S/P reverse total shoulder arthroplasty, right [B17.654]  Referring Physician:  DELMY Davey MD Orders:  PT Eval and Treat   Surgery: Right reverse TSA  Surgery Date: 23  Schedule Instructions: start therapy within a week of 23  Frequency: 2 times a week for 12  weeks  Treatment & Precautions: Evaluation & Treatment    Date of Onset:  Onset Date: 23     Allergies:   Patient has no known allergies. Restrictions/Precautions:  Restrictions/Precautions: Weight Bearing  Right Upper Extremity Weight Bearing: Non Weight Bearing     Interventions Planned (Treatment may consist of any combination of the following):    Current Treatment Recommendations: Strengthening; ROM; ADL/Self-care training; Manual; Neuromuscular re-education; Home exercise program; Patient/Caregiver education & training; Modalities; Positioning; Therapeutic activities     >Subjective Comments:    Doing pretty fair. No pain.    >Initial:     10    >Post Session:       2/10  Medications Last Reviewed:  2023  Updated Objective Findings:  None Today  Treatment   THERAPEUTIC

## 2023-06-02 ENCOUNTER — HOSPITAL ENCOUNTER (OUTPATIENT)
Dept: PHYSICAL THERAPY | Age: 68
Setting detail: RECURRING SERIES
Discharge: HOME | End: 2023-06-05
Payer: MEDICARE

## 2023-06-02 PROCEDURE — 97140 MANUAL THERAPY 1/> REGIONS: CPT

## 2023-06-02 PROCEDURE — 97110 THERAPEUTIC EXERCISES: CPT

## 2023-06-02 ASSESSMENT — PAIN SCALES - GENERAL: PAINLEVEL_OUTOF10: 1

## 2023-06-05 ENCOUNTER — HOSPITAL ENCOUNTER (OUTPATIENT)
Dept: PHYSICAL THERAPY | Age: 68
Setting detail: RECURRING SERIES
Discharge: HOME | End: 2023-06-08
Payer: MEDICARE

## 2023-06-05 PROCEDURE — 97110 THERAPEUTIC EXERCISES: CPT

## 2023-06-05 ASSESSMENT — PAIN SCALES - GENERAL: PAINLEVEL_OUTOF10: 1

## 2023-06-05 NOTE — PROGRESS NOTES
Helicobacter pylori infection, Hypothyroid, Insomnia, Seasonal allergies, and Stomach ulcer. Mr. Shannon Gruber  has a past surgical history that includes Spermatocelectomy and shoulder surgery (Right, 4/14/2023). Social History/Living Environment: independent, lives alone, retired         Prior Level of Function/Work/Activity: independent, no AD. Learning:   Does the patient/guardian have any barriers to learning?: No barriers  Will there be a co-learner?: No  What is the preferred language of the patient/guardian?: English  Is an  required?: No  How does the patient/guardian prefer to learn new concepts?: Listening; Reading; Demonstration; Pictures/Videos     Fall Risk Scale: Reynolds Total Score: 0  Reynolds Fall Risk: Low (0-24)       Dominant Side:  right handed  Personal Factors:        Sex:  male        Age:  69 y.o. OBJECTIVE   Observations:     Sling R UE; surgical incision R UE closed, no scabbing present, no bruising, mild swelling in shoulder, little to no swelling in R upper arm or forearm/hand  Functional Mobility:    independent  Sensation:   intact  Shoulder:   PROM R SHOULDER:   115 degrees flexion  95 degrees abduction,   20 degrees external rotation  IR to beltline      6/5/23: ROM R shoulder:   155 degrees flexion in supine  160 degrees abduction in supine  65 degrees external rotation in supine  50 degrees extension AROM in standing  IR AROM to R buttock in standing      Strength R  shoulder: NT  ASSESSMENT       PROGRESS NOTE 6/5/23: Patient has attended 8 PT sessions from 5/12/23 to 6/5/23 for decreased R shoulder ROM and strength s/p reverse TSA surgery on 4/14/23. Patient has tolerated physical therapy well with little to no increase in R shoulder pain, and good progression of R shoulder passive range of motion in all planes, except internal rotation which is now allowed to be progressed at 7 weeks post-op.  Per protocol, at 7 weeks post-op, patient can
session: Next visit will focus on ROM, pt education.     >Total Treatment Billable Duration:  40 minutes   Time In: 0928  Time Out: Chapin HALLMAN, PT       Charge Capture  }Post Session Pain  PT Visit Info  [facility] Portal  MD Guidelines  Scanned Media  Benefits  MyChart    Future Appointments   Date Time Provider Alexsandra Mccall   6/7/2023  9:30 AM Apurva Alvarado, PT [facility]   7/6/2023  9:45 AM DELMY Mccrary AMB

## 2023-06-06 ENCOUNTER — TELEPHONE (OUTPATIENT)
Dept: ORTHOPEDIC SURGERY | Age: 68
End: 2023-06-06

## 2023-06-06 NOTE — TELEPHONE ENCOUNTER
I spoke with pt and let him know I misunderstood Dr. Alphonso Johnson originally and though he needed to wait a year between shoulder replacements. I let him know I spoke with Dr. Alphonso Johnson again today and was told the wait time is 3 months between surgeries. I confirmed his appointment with Murali Lynne on 7/6 is to start working up the left shoulder for surgery. He was relieved and thanked me for calling him back.

## 2023-06-06 NOTE — TELEPHONE ENCOUNTER
He wants to speak to someone about getting his left shoulder fixed. He does not want to wait a year to get it done. Please give him a call.

## 2023-06-07 ENCOUNTER — HOSPITAL ENCOUNTER (OUTPATIENT)
Dept: PHYSICAL THERAPY | Age: 68
Setting detail: RECURRING SERIES
Discharge: HOME | End: 2023-06-10
Payer: MEDICARE

## 2023-06-07 PROCEDURE — 97110 THERAPEUTIC EXERCISES: CPT

## 2023-06-07 ASSESSMENT — PAIN SCALES - GENERAL: PAINLEVEL_OUTOF10: 1

## 2023-06-07 NOTE — PROGRESS NOTES
I am accessing Mr. Danelle Cosme chart as a part of our department's internal chart auditing process. I certify that Mr. Aliyah Nguyen is, or was, a patient in our department.   Thank you,  Starla Adrian, PT  6/7/2023
Patient is progressing well with passive and active ROM R shoulder with no increase in pain. Skin intact after ice. Communication/Consultation:  None today  Equipment provided today:  none  Recommendations/Intent for next treatment session: Next visit will focus on ROM, pt education.     >Total Treatment Billable Duration:  40 minutes   Time In: 0930  Time Out: Chapin HALLMAN, PT       Charge Capture  }Post Session Pain  PT Visit Info  [place] Portal  MD Guidelines  Scanned Media  Benefits  MyChart    Future Appointments   Date Time Provider Alexsandra Mccall   7/6/2023  9:45 AM DELMY Estrada AMB

## 2023-06-16 ENCOUNTER — HOSPITAL ENCOUNTER (OUTPATIENT)
Dept: PHYSICAL THERAPY | Age: 68
Setting detail: RECURRING SERIES
Discharge: HOME | End: 2023-06-19
Payer: MEDICARE

## 2023-06-16 PROCEDURE — 97110 THERAPEUTIC EXERCISES: CPT

## 2023-06-16 ASSESSMENT — PAIN SCALES - GENERAL: PAINLEVEL_OUTOF10: 1

## 2023-06-20 ENCOUNTER — HOSPITAL ENCOUNTER (OUTPATIENT)
Dept: PHYSICAL THERAPY | Age: 68
Setting detail: RECURRING SERIES
Discharge: HOME | End: 2023-06-23
Payer: MEDICARE

## 2023-06-20 PROCEDURE — 97110 THERAPEUTIC EXERCISES: CPT

## 2023-06-20 ASSESSMENT — PAIN SCALES - GENERAL: PAINLEVEL_OUTOF10: 1

## 2023-06-20 NOTE — PROGRESS NOTES
Sera Achilles  : 1955  Primary:  (No info available)  Secondary:  [facility] [address] [address]  Phone: [phone]  Fax: [phone] Plan Frequency: 2 times per week for 12 weeks  Plan of Care/Certification Expiration Date: 08/10/23      >PT Visit Info:   Plan Frequency: 2 times per week for 12 weeks  Plan of Care/Certification Expiration Date: 08/10/23  Total # of Visits to Date: 12  Progress Note Counter: 5  Progress Note Due Date: 23      Visit Count:  12   OUTPATIENT PHYSICAL THERAPY:OP NOTE TYPE: Treatment Note 2023       Episode  }Appt Desk             Treatment Diagnosis:   Pain in Right Shoulder (M25.511)  Stiffness of Right Shoulder, Not elsewhere classified (M25.611)  Medical/Referring Diagnosis:   Surgery follow-up [Z09]  S/P reverse total shoulder arthroplasty, right [U25.088]  Referring Physician:  DELMY Conner MD Orders:  PT Eval and Treat   Surgery: Right reverse TSA  Surgery Date: 23  Schedule Instructions: start therapy within a week of 23  Frequency: 2 times a week for 12  weeks  Treatment & Precautions: Evaluation & Treatment    Date of Onset:  Onset Date: 23     Allergies:   Patient has no known allergies. Restrictions/Precautions:  Restrictions/Precautions: Weight Bearing  Right Upper Extremity Weight Bearing: Non Weight Bearing     Interventions Planned (Treatment may consist of any combination of the following):    Current Treatment Recommendations: Strengthening; ROM; ADL/Self-care training; Manual; Neuromuscular re-education; Home exercise program; Patient/Caregiver education & training; Modalities; Positioning; Therapeutic activities     >Subjective Comments:    Shoulder is doing well.    >Initial:     1/10    >Post Session:       1/10   Medications Last Reviewed:  2023  Updated Objective Findings:  None Today  Treatment       THERAPEUTIC EXERCISE: ( 40 minutes):  Can start

## 2023-06-22 ENCOUNTER — HOSPITAL ENCOUNTER (OUTPATIENT)
Dept: PHYSICAL THERAPY | Age: 68
Setting detail: RECURRING SERIES
Discharge: HOME | End: 2023-06-25
Payer: MEDICARE

## 2023-06-22 PROCEDURE — 97110 THERAPEUTIC EXERCISES: CPT

## 2023-06-22 ASSESSMENT — PAIN SCALES - GENERAL: PAINLEVEL_OUTOF10: 1

## 2023-06-27 ENCOUNTER — HOSPITAL ENCOUNTER (OUTPATIENT)
Dept: PHYSICAL THERAPY | Age: 68
Setting detail: RECURRING SERIES
Discharge: HOME | End: 2023-06-30
Payer: MEDICARE

## 2023-06-27 PROCEDURE — 97110 THERAPEUTIC EXERCISES: CPT

## 2023-06-27 PROCEDURE — 97140 MANUAL THERAPY 1/> REGIONS: CPT

## 2023-06-29 ENCOUNTER — HOSPITAL ENCOUNTER (OUTPATIENT)
Dept: PHYSICAL THERAPY | Age: 68
Setting detail: RECURRING SERIES
End: 2023-06-29
Payer: MEDICARE

## 2023-06-29 PROCEDURE — 97110 THERAPEUTIC EXERCISES: CPT

## 2023-06-29 PROCEDURE — 97140 MANUAL THERAPY 1/> REGIONS: CPT

## 2023-07-03 ENCOUNTER — HOSPITAL ENCOUNTER (OUTPATIENT)
Dept: PHYSICAL THERAPY | Age: 68
Setting detail: RECURRING SERIES
Discharge: HOME | End: 2023-07-06
Payer: MEDICARE

## 2023-07-03 PROCEDURE — 97110 THERAPEUTIC EXERCISES: CPT

## 2023-07-03 ASSESSMENT — PAIN SCALES - GENERAL: PAINLEVEL_OUTOF10: 1

## 2023-07-03 NOTE — PROGRESS NOTES
Ngozi Cheema  : 1955  Primary:  (No info available)  Secondary:  [facility] [place] [address]  [address] [place] [address]  Phone: [phone]  Fax: [phone] Plan Frequency: 2 times per week for 12 weeks    Plan of Care/Certification Expiration Date: 08/10/23      PT Visit Info:   Plan Frequency: 2 times per week for 12 weeks  Plan of Care/Certification Expiration Date: 08/10/23  Total # of Visits to Date: 16  Progress Note Counter: 1  Progress Note Due Date: 23      Visit Count:  16                OUTPATIENT PHYSICAL THERAPY:             OP NOTE TYPE: Progress Report 7/3/2023               Episode (s/p R reverse TSA) Appt Desk         Treatment Diagnosis:  Pain in Right Shoulder (M25.511)  Stiffness of Right Shoulder, Not elsewhere classified (M25.611)  Medical/Referring Diagnosis:  Surgery follow-up [Z09]  S/P reverse total shoulder arthroplasty, right [Z94.918]  Referring Physician:  DELMY Riley MD Orders:  PT Eval and Treat   Surgery: Right reverse TSA  Surgery Date: 23  Schedule Instructions: start therapy within a week of 23  Frequency: 2 times a week for 12  weeks  Treatment & Precautions: Evaluation & Treatment per protocol  Preferred Facility/Location: Pt Preference  Return MD Appt:  23  Date of Onset:  Onset Date: 23      Allergies:  Patient has no known allergies. Restrictions/Precautions:           Medications Last Reviewed:  7/3/2023     SUBJECTIVE   History of Injury/Illness (Reason for Referral):  S/P reverse TSA R 23. Since surgery, have been doing hand/wrist /elbow AROM exercises, pendulums, and shoulder shrugs, and wearing sling. R handed. Patient Stated Goal(s):   \"To be able to move my arm\"  Initial:     1/10 Post Session:     1/10   Past Medical History/Comorbidities:   Mr. Erika Halsted  has a past medical history of Anxiety, Arthritis, BPH (benign prostatic hyperplasia), Depression, Helicobacter

## 2023-07-03 NOTE — PROGRESS NOTES
Candie Neal  : 1955  Primary:  (No info available)  Secondary:  [facility] [address] [address]  Phone: [phone]  Fax: [phone] Plan Frequency: 2 times per week for 12 weeks  Plan of Care/Certification Expiration Date: 08/10/23      >PT Visit Info:     Plan Frequency: 2 times per week for 12 weeks  Plan of Care/Certification Expiration Date: 08/10/23  Total # of Visits to Date: 16  Progress Note Counter: 1  Progress Note Due Date: 23      Visit Count:  16   OUTPATIENT PHYSICAL THERAPY:OP NOTE TYPE: Treatment Note 7/3/2023       Episode  }Appt Desk             Treatment Diagnosis:   Pain in Right Shoulder (M25.511)  Stiffness of Right Shoulder, Not elsewhere classified (M25.611)  Medical/Referring Diagnosis:   Surgery follow-up [Z09]  S/P reverse total shoulder arthroplasty, right [T39.418]  Referring Physician:  DELMY Adams MD Orders:  PT Eval and Treat   Surgery: Right reverse TSA  Surgery Date: 23  Schedule Instructions: start therapy within a week of 23  Frequency: 2 times a week for 12  weeks  Treatment & Precautions: Evaluation & Treatment    Date of Onset:  Onset Date: 23     Allergies:   Patient has no known allergies. Restrictions/Precautions:  Restrictions/Precautions: Weight Bearing  Right Upper Extremity Weight Bearing: Non Weight Bearing     Interventions Planned (Treatment may consist of any combination of the following):    Current Treatment Recommendations: Strengthening; ROM; ADL/Self-care training; Manual; Neuromuscular re-education; Home exercise program; Patient/Caregiver education & training; Modalities; Positioning; Therapeutic activities     >Subjective Comments:   I've been doing well.    >Initial:     1/10    >Post Session:       1/10   Medications Last Reviewed:  7/3/2023  Updated Objective Findings:  None Today  Treatment       THERAPEUTIC EXERCISE: ( 40 minutes):    Exercises

## 2023-07-06 ENCOUNTER — OFFICE VISIT (OUTPATIENT)
Dept: ORTHOPEDIC SURGERY | Age: 68
End: 2023-07-06
Payer: MEDICARE

## 2023-07-06 DIAGNOSIS — M19.012 PRIMARY OSTEOARTHRITIS OF LEFT SHOULDER: Primary | ICD-10-CM

## 2023-07-06 PROCEDURE — 1123F ACP DISCUSS/DSCN MKR DOCD: CPT | Performed by: PHYSICIAN ASSISTANT

## 2023-07-06 PROCEDURE — 99213 OFFICE O/P EST LOW 20 MIN: CPT | Performed by: PHYSICIAN ASSISTANT

## 2023-07-10 NOTE — PROGRESS NOTES
Name: Leslye Arredondo  YOB: 1955  Gender: male  MRN: [id number]    CC:   Chief Complaint   Patient presents with    Shoulder Pain     Left shoulder pain  S/p right RTSA 4/14/23        HPI: Known patient to the practice for right total shoulder arthroplasty presents for left shoulder pain. The patient has previously been treated for this and is interested in proceeding surgically. The patient has had prior right TSA. Patient states he is working on strengthening. Denies numbness and tingling. No Known Allergies  Past Medical History:   Diagnosis Date    Anxiety     Meds as needed    Arthritis     BPH (benign prostatic hyperplasia)     Depression     managed with meds    Helicobacter pylori infection     Hx of    Hypothyroid     Insomnia     Seasonal allergies     Stomach ulcer     Hx of     Past Surgical History:   Procedure Laterality Date    SHOULDER SURGERY Right 4/14/2023    RIGHT SHOULDER TOTAL ARTHROPLASTY REVERSE -REGIONAL BLOCK -23 HOUR OBSERVATION performed by Kelly Sumner MD at [address] [place] [address]       History reviewed. No pertinent family history. Social History     Socioeconomic History    Marital status:       Spouse name: Not on file    Number of children: Not on file    Years of education: Not on file    Highest education level: Not on file   Occupational History    Not on file   Tobacco Use    Smoking status: Every Day     Packs/day: 0.50     Years: 51.00     Pack years: 25.50     Types: Cigarettes    Smokeless tobacco: Never   Vaping Use    Vaping Use: Never used   Substance and Sexual Activity    Alcohol use: Not Currently    Drug use: Not Currently    Sexual activity: Not on file   Other Topics Concern    Not on file   Social History Narrative    Not on file     Social Determinants of Health     Financial Resource Strain: Not on file   Food Insecurity: Not on file   Transportation Needs: Not on file   Physical Activity: Not on file   Stress: Not on

## 2023-07-12 ENCOUNTER — HOSPITAL ENCOUNTER (OUTPATIENT)
Dept: PHYSICAL THERAPY | Age: 68
Setting detail: RECURRING SERIES
Discharge: HOME | End: 2023-07-15
Payer: MEDICARE

## 2023-07-12 PROCEDURE — 97110 THERAPEUTIC EXERCISES: CPT

## 2023-07-12 PROCEDURE — 97140 MANUAL THERAPY 1/> REGIONS: CPT

## 2023-07-12 NOTE — PROGRESS NOTES
Marcela Mcmillan  : 1955  Primary:  (No info available)  Secondary:   [place] [address]  [address] [facility] [address]  Phone: [phone]  Fax: [phone] Plan Frequency: 2 times per week for 12 weeks  Plan of Care/Certification Expiration Date: 08/10/23      >PT Visit Info:     Plan Frequency: 2 times per week for 12 weeks  Plan of Care/Certification Expiration Date: 08/10/23  Total # of Visits to Date: 17  Progress Note Counter: 2  Progress Note Due Date: 23      Visit Count:  17   OUTPATIENT PHYSICAL THERAPY:OP NOTE TYPE: Treatment Note 2023       Episode  }Appt Desk             Treatment Diagnosis:   Pain in Right Shoulder (M25.511)  Stiffness of Right Shoulder, Not elsewhere classified (M25.611)  Medical/Referring Diagnosis:   Surgery follow-up [Z09]  S/P reverse total shoulder arthroplasty, right [Z08.568]  Referring Physician:  DELMY Mayers MD Orders:  PT Eval and Treat   Surgery: Right reverse TSA  Surgery Date: 23  Schedule Instructions: start therapy within a week of 23  Frequency: 2 times a week for 12  weeks  Treatment & Precautions: Evaluation & Treatment    Date of Onset:  Onset Date: 23     Allergies:   Patient has no known allergies. Restrictions/Precautions:  Restrictions/Precautions: Weight Bearing  Right Upper Extremity Weight Bearing: Non Weight Bearing     Interventions Planned (Treatment may consist of any combination of the following):    Current Treatment Recommendations: Strengthening; ROM; ADL/Self-care training; Manual; Neuromuscular re-education; Home exercise program; Patient/Caregiver education & training; Modalities; Positioning;  Therapeutic activities     >Subjective Comments: \"I am doing pretty good overall\"      >Initial:   10    >Post Session:      2  /10   Medications Last Reviewed:  2023  Updated Objective Findings:  None Today  Treatment       THERAPEUTIC EXERCISE: ( 35 minutes):

## 2023-07-13 ENCOUNTER — TELEPHONE (OUTPATIENT)
Dept: ORTHOPEDIC SURGERY | Age: 68
End: 2023-07-13

## 2023-07-18 ENCOUNTER — HOSPITAL ENCOUNTER (OUTPATIENT)
Dept: PHYSICAL THERAPY | Age: 68
Setting detail: RECURRING SERIES
Discharge: HOME | End: 2023-07-21
Payer: MEDICARE

## 2023-07-18 PROCEDURE — 97110 THERAPEUTIC EXERCISES: CPT

## 2023-07-18 ASSESSMENT — PAIN SCALES - GENERAL: PAINLEVEL_OUTOF10: 0

## 2023-07-18 NOTE — PROGRESS NOTES
Cody Coello  : 1955  Primary:  (No info available)  Secondary:  [facility] [address]  [facility] [facility] [address]  Phone: [phone]  Fax: [phone] Plan Frequency: 2 times per week for 12 weeks  Plan of Care/Certification Expiration Date: 08/10/23      >PT Visit Info:     Plan Frequency: 2 times per week for 12 weeks  Plan of Care/Certification Expiration Date: 08/10/23  Total # of Visits to Date: 18  Progress Note Counter: 3  Progress Note Due Date: 23      Visit Count:  18   OUTPATIENT PHYSICAL THERAPY:OP NOTE TYPE: Treatment Note 2023       Episode  }Appt Desk             Treatment Diagnosis:   Pain in Right Shoulder (M25.511)  Stiffness of Right Shoulder, Not elsewhere classified (M25.611)  Medical/Referring Diagnosis:   Surgery follow-up [Z09]  S/P reverse total shoulder arthroplasty, right [C37.065]  Referring Physician:  DELMY Diaz MD Orders:  PT Eval and Treat   Surgery: Right reverse TSA  Surgery Date: 23  Schedule Instructions: start therapy within a week of 23  Frequency: 2 times a week for 12  weeks  Treatment & Precautions: Evaluation & Treatment    Date of Onset:  Onset Date: 23     Allergies:   Patient has no known allergies. Restrictions/Precautions:  Restrictions/Precautions: Weight Bearing  Right Upper Extremity Weight Bearing: Non Weight Bearing     Interventions Planned (Treatment may consist of any combination of the following):    Current Treatment Recommendations: Strengthening; ROM; ADL/Self-care training; Manual; Neuromuscular re-education; Home exercise program; Patient/Caregiver education & training; Modalities; Positioning; Therapeutic activities     >Subjective Comments:   Doing pretty good. To get MRI on the other shoulder on Friday.    >Initial:   2  0/10    >Post Session:      2 0/10   Medications Last Reviewed:  2023  Updated Objective Findings:  None Today  Treatment

## 2023-07-21 ENCOUNTER — HOSPITAL ENCOUNTER (OUTPATIENT)
Dept: PHYSICAL THERAPY | Age: 68
Setting detail: RECURRING SERIES
Discharge: HOME | End: 2023-07-24
Payer: MEDICARE

## 2023-07-21 DIAGNOSIS — M19.012 PRIMARY OSTEOARTHRITIS OF LEFT SHOULDER: ICD-10-CM

## 2023-07-21 PROCEDURE — 97110 THERAPEUTIC EXERCISES: CPT

## 2023-07-21 ASSESSMENT — PAIN SCALES - GENERAL: PAINLEVEL_OUTOF10: 0

## 2023-07-21 NOTE — PROGRESS NOTES
Peña Santos  : 1955  Primary:  (No info available)  Secondary:  [facility] [place] [address] [address] [facility] [address]  Phone: [phone]  Fax: [phone] Plan Frequency: 2 times per week for 12 weeks  Plan of Care/Certification Expiration Date: 08/10/23      >PT Visit Info:     Plan Frequency: 2 times per week for 12 weeks  Plan of Care/Certification Expiration Date: 08/10/23  Total # of Visits to Date: 19  Progress Note Counter: 4  Progress Note Due Date: 23      Visit Count:  19   OUTPATIENT PHYSICAL THERAPY:OP NOTE TYPE: Treatment Note 2023       Episode  }Appt Desk             Treatment Diagnosis:   Pain in Right Shoulder (M25.511)  Stiffness of Right Shoulder, Not elsewhere classified (M25.611)  Medical/Referring Diagnosis:   Surgery follow-up [Z09]  S/P reverse total shoulder arthroplasty, right [K43.640]  Referring Physician:  DELMY Brewer MD Orders:  PT Eval and Treat   Surgery: Right reverse TSA  Surgery Date: 23  Schedule Instructions: start therapy within a week of 23  Frequency: 2 times a week for 12  weeks  Treatment & Precautions: Evaluation & Treatment    Date of Onset:  Onset Date: 23     Allergies:   Patient has no known allergies. Restrictions/Precautions:  Restrictions/Precautions: Weight Bearing  Right Upper Extremity Weight Bearing: Non Weight Bearing     Interventions Planned (Treatment may consist of any combination of the following):    Current Treatment Recommendations: Strengthening; ROM; ADL/Self-care training; Manual; Neuromuscular re-education; Home exercise program; Patient/Caregiver education & training; Modalities; Positioning; Therapeutic activities     >Subjective Comments:   Patient reports he is doing well.    >Initial:     0/10    >Post Session:      0/10   Medications Last Reviewed:  2023  Updated Objective Findings:  None Today  Treatment       THERAPEUTIC EXERCISE: ( 40 minutes):

## 2023-07-25 ENCOUNTER — HOSPITAL ENCOUNTER (OUTPATIENT)
Dept: PHYSICAL THERAPY | Age: 68
Setting detail: RECURRING SERIES
End: 2023-07-25
Payer: MEDICARE

## 2023-07-25 NOTE — PROGRESS NOTES
Anny Velasquez  : 1955  Primary: [facility] [employer]  Secondary:  Debora Cook  [address] [place] [address]  Phone: [phone]  Fax: [phone]    PT Visit Info: Total # of Visits to Date:   Progress Note Counter: 4  Progress Note Due Date: 23     OT Visit Info:  No data recorded    OUTPATIENT THERAPY: 2023  Episode  Appt Desk        Anny Velasquez cancelled his appointment for today due to  \"won't make it in time\" . Will plan to follow up next during next appointment.   Thank you,  John Abad, PT    Future Appointments   Date Time Provider [address]   2023  9:30 AM Avelina Underwood PTA [facility]   2023  2:30 PM MD CRYS Horowitz AMB   8/3/2023 10:15 AM John Abad, PT [facility]

## 2023-07-27 ENCOUNTER — HOSPITAL ENCOUNTER (OUTPATIENT)
Dept: PHYSICAL THERAPY | Age: 68
Setting detail: RECURRING SERIES
Discharge: HOME | End: 2023-07-30
Payer: MEDICARE

## 2023-07-27 PROCEDURE — 97110 THERAPEUTIC EXERCISES: CPT

## 2023-07-27 NOTE — PROGRESS NOTES
THERAPEUTIC EXERCISE: ( 40 minutes):    Exercises per grid below to improve mobility and strength. Required minimal verbal cues to promote proper body alignment, promote proper body posture, and promote proper body mechanics. Progressed resistance, range, repetitions, and complexity of movement as indicated. Date   7/27/23   Activity/Exercise    Thera band Rows      Thera band Extension      Thera band IR      Thera band ER Blue  2 x 15 Right    Blue  2x  15 Right    Blue 2 x 15 Right    Red 2 x 15 Right with cuing to not straighten elbow   Standing R shoulder abduction Red thera band 2 x 15 reps R   Bent over row  Bent over Extension R UE 2 x 15 reps #5 each exercise   Prone:     1)Shoulder extension #1  3)Shoulder horizontal abduction #1  4)Shoulder flexion 2 x 15 reps each exercise R UE   Supine serratus punches R shoulder 2 x 15 reps 5#           Standing R shoulder flexion AROM 2 X 15 reps with 1#   Wall walks X 5 reps                   L sidelying R shoulder ER AROM 2 x 15 reps with 1#       Standing R shoulder IR AROM reaching behind x   Seated pulleys for R shoulder flexion/scaption 2 x 10 reps as tolerated     Standing finger ladder R shoulder flexion    UBE Level 4 x 6 minutes, 3 fwd/3 back      MANUAL THERAPY: (0 minutes):   Joint mobilization and Soft tissue mobilization was utilized and necessary because of the patient's restricted joint motion and restricted motion of soft tissue. In supine, ROM R shoulder in external rotation, gentle contract relax to increase ROM. MODALITIES: no ice today       *  Cold Pack Therapy in order to provide analgesia and reduce inflammation and edema. Ice packs to R shoulder x 0 minutes at end of session. Treatment/Session Summary:    >Treatment Assessment:  Patient progressing well with Right shoulder at this time.         Communication/Consultation:  None today  Equipment provided today:  none  Recommendations/Intent for next treatment session: Next

## 2023-08-01 ENCOUNTER — OFFICE VISIT (OUTPATIENT)
Dept: ORTHOPEDIC SURGERY | Age: 68
End: 2023-08-01
Payer: MEDICARE

## 2023-08-01 ENCOUNTER — APPOINTMENT (OUTPATIENT)
Dept: PHYSICAL THERAPY | Age: 68
End: 2023-08-01
Payer: MEDICARE

## 2023-08-01 DIAGNOSIS — M75.102 LEFT ROTATOR CUFF TEAR ARTHROPATHY: ICD-10-CM

## 2023-08-01 DIAGNOSIS — M19.012 PRIMARY OSTEOARTHRITIS OF LEFT SHOULDER: Primary | ICD-10-CM

## 2023-08-01 DIAGNOSIS — G56.22 CUBITAL TUNNEL SYNDROME ON LEFT: ICD-10-CM

## 2023-08-01 DIAGNOSIS — M12.812 LEFT ROTATOR CUFF TEAR ARTHROPATHY: ICD-10-CM

## 2023-08-01 PROCEDURE — 1123F ACP DISCUSS/DSCN MKR DOCD: CPT | Performed by: ORTHOPAEDIC SURGERY

## 2023-08-01 PROCEDURE — 99215 OFFICE O/P EST HI 40 MIN: CPT | Performed by: ORTHOPAEDIC SURGERY

## 2023-08-01 NOTE — PROGRESS NOTES
Name: Minoo Quiroz  YOB: 1955  Gender: male  MRN: [id number]    CC:   Chief Complaint   Patient presents with    Shoulder Pain     Left shoulder CT results        HPI: Patient presents for CT follow-up of left shoulder for surgical planning. The patient is also status post right reverse total shoulder arthroplasty on 4-. His right shoulder is very good. His left shoulder has continued to bother him. He has some numbness and tingling in the small and ring finger. No Known Allergies  Past Medical History:   Diagnosis Date    Anxiety     Meds as needed    Arthritis     BPH (benign prostatic hyperplasia)     Depression     managed with meds    Helicobacter pylori infection     Hx of    Hypothyroid     Insomnia     Seasonal allergies     Stomach ulcer     Hx of     Past Surgical History:   Procedure Laterality Date    SHOULDER SURGERY Right 4/14/2023    RIGHT SHOULDER TOTAL ARTHROPLASTY REVERSE -REGIONAL BLOCK -23 HOUR OBSERVATION performed by Idalia Tomlinson MD at [address] [place] [address]       History reviewed. No pertinent family history. Social History     Socioeconomic History    Marital status:       Spouse name: Not on file    Number of children: Not on file    Years of education: Not on file    Highest education level: Not on file   Occupational History    Not on file   Tobacco Use    Smoking status: Every Day     Packs/day: 0.50     Years: 51.00     Pack years: 25.50     Types: Cigarettes    Smokeless tobacco: Never   Vaping Use    Vaping Use: Never used   Substance and Sexual Activity    Alcohol use: Not Currently    Drug use: Not Currently    Sexual activity: Not on file   Other Topics Concern    Not on file   Social History Narrative    Not on file     Social Determinants of Health     Financial Resource Strain: Not on file   Food Insecurity: Not on file   Transportation Needs: Not on file   Physical Activity: Not on file   Stress: Not on file   Social

## 2023-08-03 ENCOUNTER — HOSPITAL ENCOUNTER (OUTPATIENT)
Dept: PHYSICAL THERAPY | Age: 68
Setting detail: RECURRING SERIES
Discharge: HOME | End: 2023-08-06
Payer: MEDICARE

## 2023-08-03 PROCEDURE — 97110 THERAPEUTIC EXERCISES: CPT

## 2023-08-03 NOTE — THERAPY DISCHARGE
will demonstrate independence and compliance with home exercise program to improve ROM and strength for daily activities. MET    Discharge Goals: Time Frame:12 weeks  Patient will report decreased R shoulder pain to less than or equal to 0/10 with AROM and ADLs to improve patient's tolerance of daily activities. MET  Patient will demonstrate improved R shoulder AROM to >/= 140 degrees flexion and abduction to improve patient's tolerance of daily activities. MET  Patient will demonstrate improved R shoulder AROM to >/+ 80 degrees external rotation to improve patient's tolerance of daily activities. MET  Patient will be able to reach behind (internally rotate) R shoulder to at least low lumbar region to facilitate dressing and washing activities. MET  Patient will increase R shoulder strength to greater than or equal to 4/5 to improve strength for daily activities. MET         Outcome Measure: Tool Used: Disabilities of the Arm, Shoulder and Hand (DASH) Questionnaire - Quick Version  Score:  Initial: 32/55  Most Recent: 12/55 (Date: 08/03/23)   Interpretation of Score: The DASH is designed to measure the activities of daily living in person's with upper extremity dysfunction or pain. Each section is scored on a 1-5 scale, 5 representing the greatest disability. The scores of each section are added together for a total score of 55. Medical Necessity:   > Patient is expected to demonstrate progress in strength, range of motion, and functional technique to increase independence with daily activities. Reason For Services/Other Comments:  > Patient continues to demonstrate capacity to improve strength, ROM, pain level which will increase independence and increase safety.   Total Duration:  Time In: 1015  Time Out: 1055           Post Session Pain  Charge Capture  PT Visit Info MD Guidelines  [place]

## 2023-08-03 NOTE — PROGRESS NOTES
minutes at end of session. Treatment/Session Summary:    >Treatment Assessment:    Pt tolerated treatments well and has reached all goals. Discharge from PT. Communication/Consultation:  None today  Equipment provided today:  none  Recommendations/Intent for next treatment session: Next visit will focus on ROM, pt education. >Total Treatment Billable Duration:  40 minutes   Time In: 1015  Time Out: [address], PT       Charge Capture  }Post Session Pain  PT Visit Info  [facility]  MD Guidelines  Scanned Media  Benefits  MyChart    No future appointments.

## 2023-09-05 ENCOUNTER — PROCEDURE VISIT (OUTPATIENT)
Dept: PHYSICAL MEDICINE AND REHAB | Age: 68
End: 2023-09-05

## 2023-09-05 ENCOUNTER — TELEPHONE (OUTPATIENT)
Dept: ORTHOPEDIC SURGERY | Age: 68
End: 2023-09-05

## 2023-09-05 VITALS
HEART RATE: 98 BPM | HEIGHT: 71 IN | SYSTOLIC BLOOD PRESSURE: 115 MMHG | DIASTOLIC BLOOD PRESSURE: 80 MMHG | WEIGHT: 188 LBS | BODY MASS INDEX: 26.32 KG/M2

## 2023-09-05 DIAGNOSIS — M19.012 PRIMARY OSTEOARTHRITIS OF LEFT SHOULDER: Primary | ICD-10-CM

## 2023-09-05 DIAGNOSIS — M12.812 LEFT ROTATOR CUFF TEAR ARTHROPATHY: ICD-10-CM

## 2023-09-05 DIAGNOSIS — M25.512 CHRONIC LEFT SHOULDER PAIN: Primary | ICD-10-CM

## 2023-09-05 DIAGNOSIS — G89.29 CHRONIC LEFT SHOULDER PAIN: Primary | ICD-10-CM

## 2023-09-05 DIAGNOSIS — M75.102 LEFT ROTATOR CUFF TEAR ARTHROPATHY: ICD-10-CM

## 2023-09-05 RX ORDER — EZETIMIBE 10 MG/1
TABLET ORAL
COMMUNITY
Start: 2023-06-06

## 2023-09-05 RX ORDER — LEVOTHYROXINE SODIUM 112 UG/1
112 TABLET ORAL DAILY
COMMUNITY
Start: 2023-06-06

## 2023-09-27 ENCOUNTER — HOSPITAL ENCOUNTER (OUTPATIENT)
Dept: SURGERY | Age: 68
Discharge: HOME | End: 2023-09-30
Payer: MEDICARE

## 2023-09-27 VITALS
WEIGHT: 187.39 LBS | HEART RATE: 88 BPM | RESPIRATION RATE: 16 BRPM | TEMPERATURE: 98.4 F | DIASTOLIC BLOOD PRESSURE: 71 MMHG | BODY MASS INDEX: 26.23 KG/M2 | HEIGHT: 71 IN | SYSTOLIC BLOOD PRESSURE: 100 MMHG | OXYGEN SATURATION: 96 %

## 2023-09-27 LAB
ANION GAP SERPL CALC-SCNC: 4 MMOL/L (ref 2–11)
BUN SERPL-MCNC: 14 MG/DL (ref 8–23)
CALCIUM SERPL-MCNC: 8.8 MG/DL (ref 8.3–10.4)
CHLORIDE SERPL-SCNC: 112 MMOL/L (ref 101–110)
CO2 SERPL-SCNC: 28 MMOL/L (ref 21–32)
CREAT SERPL-MCNC: 1.09 MG/DL (ref 0.8–1.5)
ERYTHROCYTE [DISTWIDTH] IN BLOOD BY AUTOMATED COUNT: 13.6 % (ref 11.9–14.6)
GLUCOSE SERPL-MCNC: 102 MG/DL (ref 65–100)
HCT VFR BLD AUTO: 40.9 % (ref 41.1–50.3)
HGB BLD-MCNC: 14 G/DL (ref 13.6–17.2)
MCH RBC QN AUTO: 32.6 PG (ref 26.1–32.9)
MCHC RBC AUTO-ENTMCNC: 34.2 G/DL (ref 31.4–35)
MCV RBC AUTO: 95.3 FL (ref 82–102)
MRSA DNA SPEC QL NAA+PROBE: NOT DETECTED
NRBC # BLD: 0 K/UL (ref 0–0.2)
PLATELET # BLD AUTO: 308 K/UL (ref 150–450)
PMV BLD AUTO: 10 FL (ref 9.4–12.3)
POTASSIUM SERPL-SCNC: 4.3 MMOL/L (ref 3.5–5.1)
RBC # BLD AUTO: 4.29 M/UL (ref 4.23–5.6)
S AUREUS CPE NOSE QL NAA+PROBE: NOT DETECTED
SODIUM SERPL-SCNC: 144 MMOL/L (ref 133–143)
WBC # BLD AUTO: 7.8 K/UL (ref 4.3–11.1)

## 2023-09-27 PROCEDURE — 85027 COMPLETE CBC AUTOMATED: CPT

## 2023-09-27 PROCEDURE — 87641 MR-STAPH DNA AMP PROBE: CPT

## 2023-09-27 PROCEDURE — 80048 BASIC METABOLIC PNL TOTAL CA: CPT

## 2023-09-27 NOTE — PERIOP NOTE
Labs done today within anesthesia protocols except sodium - will have anesthesia review.       Latest Reference Range & Units 09/27/23 10:40   Sodium 133 - 143 mmol/L 144 (H)   Potassium 3.5 - 5.1 mmol/L 4.3   Chloride 101 - 110 mmol/L 112 (H)   CO2 21 - 32 mmol/L 28   BUN,BUNPL 8 - 23 MG/DL 14   Creatinine 0.8 - 1.5 MG/DL 1.09   Anion Gap 2 - 11 mmol/L 4   Est, Glom Filt Rate >60 ml/min/1.73m2 >60   Glucose, Random 65 - 100 mg/dL 102 (H)   CALCIUM, SERUM, 696660 8.3 - 10.4 MG/DL 8.8   WBC 4.3 - 11.1 K/uL 7.8   RBC 4.23 - 5.6 M/uL 4.29   Hemoglobin Quant 13.6 - 17.2 g/dL 14.0   Hematocrit 41.1 - 50.3 % 40.9 (L)   MCV 82.0 - 102.0 FL 95.3   MCH 26.1 - 32.9 PG 32.6   MCHC 31.4 - 35.0 g/dL 34.2   MPV 9.4 - 12.3 FL 10.0   RDW 11.9 - 14.6 % 13.6   Platelet Count 917 - 450 K/uL 308   Nucleated Red Blood Cells 0.0 - 0.2 K/uL 0.00   MRSA/STAPH AUREUS DNA  Rpt   (H): Data is abnormally high  (L): Data is abnormally low  Rpt: View report in Results Review for more information

## 2023-09-27 NOTE — PROGRESS NOTES
Total Joint Surgery Preoperative Chart Review      Patient ID:  Job Jeffries  [id number]  77 y.o.  1955  Surgeon: Dr Sanford Cisneros  Date of Surgery: 10/6/2023  Procedure: Total Left Shoulder Arthroplasty  Primary Care Physician: Yrn Avila [phone]  Specialty Physician(s):      Subjective:   Job Jeffries is a 77 y.o. White (non-) male who presents for preoperative evaluation for Total Left Shoulder arthroplasty. This is a preoperative chart review note based on data collected by the nurse at the surgical Pre-Assessment visit. Past Medical History:   Diagnosis Date    Anxiety     Meds as needed    Arthritis     BPH (benign prostatic hyperplasia)     Depression     managed with meds    Helicobacter pylori infection     Hx of    Hypothyroid     Insomnia     Seasonal allergies     Stomach ulcer     Hx of      Past Surgical History:   Procedure Laterality Date    SHOULDER SURGERY Right 4/14/2023    RIGHT SHOULDER TOTAL ARTHROPLASTY REVERSE -REGIONAL BLOCK -23 HOUR OBSERVATION performed by Jean Claude Heredia MD at [address] [place] [address]       History reviewed. No pertinent family history. Social History     Tobacco Use    Smoking status: Every Day     Packs/day: 0.50     Years: 51.00     Additional pack years: 0.00     Total pack years: 25.50     Types: Cigarettes    Smokeless tobacco: Never   Substance Use Topics    Alcohol use: Not Currently       Prior to Admission medications    Medication Sig Start Date End Date Taking? Authorizing Provider   levothyroxine (SYNTHROID) 112 MCG tablet Take 1 tablet by mouth daily 6/6/23   Historical Provider, MD   ezetimibe (ZETIA) 10 MG tablet TAKE 1 TABLET (10 MG) BY MOUTH DAILY.  6/6/23   Historical Provider, MD   naloxegol (MOVANTIK) 25 MG TABS tablet Take 1 tablet by mouth every morning  Patient not taking: Reported on 4/14/2023 4/12/23   DELMY Henson   aspirin 325 MG tablet Take 1 tablet by mouth in the morning and at bedtime for 7 days To

## 2023-09-27 NOTE — PERIOP NOTE
PLEASE CONTINUE TAKING ALL PRESCRIPTION MEDICATIONS UP TO THE DAY OF SURGERY UNLESS OTHERWISE DIRECTED BELOW. DISCONTINUE all vitamins and supplements 7 days prior to surgery. DISCONTINUE Non-Steroidal Anti-Inflammatory (NSAIDS) such as Advil and Aleve 5 days prior to surgery. Home Medications to take  the day of surgery      Xanax if needed     Synthroid        Home Medications   to Hold           Comments      On the day before surgery please take Acetaminophen 1000mg in the morning and then again before bed. You may substitute for Tylenol 650 mg. Please do not bring home medications with you on the day of surgery unless otherwise directed by your nurse. If you are instructed to bring home medications, please give them to your nurse as they will be administered by the nursing staff. If you have any questions, please call [address] [place] [address] [phone]. A copy of this note was provided to the patient for reference.

## 2023-09-27 NOTE — PERIOP NOTE
Patient verified name and . Order for consent not found in EHR ; patient verified. Type 3 surgery, Walk in assessment complete. Labs per surgeon: no orders in EMR  Labs per anesthesia protocol: cbc,bmp  EKG:not needed per protocols. MRSA/MSSA swab collected; pharmacy to review and dose antibiotic as appropriate. Hospital approved surgical skin cleanser and instructions to return bottle on DOS given per hospital policy. Patient provided with handouts including Guide to Surgery, Pain Management, Hand Hygiene, Blood Transfusion Education, and [facility] Brochure. Patient answered medical/surgical history questions at their best of ability. All prior to admission medications documented in [facility]. Original medication prescription bottle not visualized during patient appointment. Patient instructed to hold all vitamins 7 days prior to surgery and NSAIDS 5 days prior to surgery. Patient teach back successful and patient demonstrates knowledge of instruction.

## 2023-10-04 ENCOUNTER — PREP FOR PROCEDURE (OUTPATIENT)
Dept: ORTHOPEDIC SURGERY | Age: 68
End: 2023-10-04

## 2023-10-04 DIAGNOSIS — M12.812 LEFT ROTATOR CUFF TEAR ARTHROPATHY: Primary | ICD-10-CM

## 2023-10-04 DIAGNOSIS — M19.012 PRIMARY OSTEOARTHRITIS OF LEFT SHOULDER: Primary | ICD-10-CM

## 2023-10-04 DIAGNOSIS — M75.102 LEFT ROTATOR CUFF TEAR ARTHROPATHY: Primary | ICD-10-CM

## 2023-10-04 RX ORDER — ONDANSETRON 8 MG/1
4 TABLET, ORALLY DISINTEGRATING ORAL EVERY 6 HOURS
Qty: 16 TABLET | Refills: 0 | Status: SHIPPED | OUTPATIENT
Start: 2023-10-04

## 2023-10-04 RX ORDER — OXYCODONE AND ACETAMINOPHEN 7.5; 325 MG/1; MG/1
1-2 TABLET ORAL
Qty: 36 TABLET | Refills: 0 | Status: SHIPPED | OUTPATIENT
Start: 2023-10-04 | End: 2023-10-07

## 2023-10-04 RX ORDER — AMOXICILLIN 250 MG
1 CAPSULE ORAL DAILY
Qty: 21 TABLET | Refills: 0 | Status: SHIPPED | OUTPATIENT
Start: 2023-10-04

## 2023-10-04 RX ORDER — DIPHENHYDRAMINE HYDROCHLORIDE 50 MG/ML
25 INJECTION INTRAMUSCULAR; INTRAVENOUS EVERY 6 HOURS PRN
Status: CANCELLED | OUTPATIENT
Start: 2023-10-04

## 2023-10-04 RX ORDER — SODIUM CHLORIDE 0.9 % (FLUSH) 0.9 %
5-40 SYRINGE (ML) INJECTION PRN
Status: CANCELLED | OUTPATIENT
Start: 2023-10-04

## 2023-10-04 RX ORDER — OXYCODONE AND ACETAMINOPHEN 7.5; 325 MG/1; MG/1
1 TABLET ORAL EVERY 4 HOURS PRN
Status: CANCELLED | OUTPATIENT
Start: 2023-10-04

## 2023-10-04 RX ORDER — DIPHENHYDRAMINE HCL 25 MG
25 TABLET ORAL EVERY 6 HOURS PRN
Status: CANCELLED | OUTPATIENT
Start: 2023-10-04

## 2023-10-04 RX ORDER — SODIUM CHLORIDE 9 MG/ML
INJECTION, SOLUTION INTRAVENOUS CONTINUOUS
Status: CANCELLED | OUTPATIENT
Start: 2023-10-04

## 2023-10-04 RX ORDER — ASPIRIN 325 MG
325 TABLET, DELAYED RELEASE (ENTERIC COATED) ORAL 2 TIMES DAILY
Status: CANCELLED | OUTPATIENT
Start: 2023-10-04

## 2023-10-04 RX ORDER — ASPIRIN 325 MG
325 TABLET ORAL 2 TIMES DAILY
Qty: 14 TABLET | Refills: 0 | Status: SHIPPED | OUTPATIENT
Start: 2023-10-04 | End: 2023-10-11

## 2023-10-04 RX ORDER — SODIUM CHLORIDE 9 MG/ML
INJECTION, SOLUTION INTRAVENOUS PRN
Status: CANCELLED | OUTPATIENT
Start: 2023-10-04

## 2023-10-04 RX ORDER — ONDANSETRON 2 MG/ML
4 INJECTION INTRAMUSCULAR; INTRAVENOUS EVERY 6 HOURS PRN
Status: CANCELLED | OUTPATIENT
Start: 2023-10-04

## 2023-10-04 RX ORDER — SODIUM CHLORIDE 0.9 % (FLUSH) 0.9 %
5-40 SYRINGE (ML) INJECTION EVERY 12 HOURS SCHEDULED
Status: CANCELLED | OUTPATIENT
Start: 2023-10-04

## 2023-10-04 RX ORDER — OXYCODONE AND ACETAMINOPHEN 7.5; 325 MG/1; MG/1
2 TABLET ORAL EVERY 4 HOURS PRN
Status: CANCELLED | OUTPATIENT
Start: 2023-10-04

## 2023-10-04 RX ORDER — NALOXONE HYDROCHLORIDE 4 MG/.1ML
1 SPRAY NASAL PRN
Qty: 1 EACH | Refills: 1 | Status: SHIPPED | OUTPATIENT
Start: 2023-10-04

## 2023-10-04 RX ORDER — ONDANSETRON 4 MG/1
4 TABLET, ORALLY DISINTEGRATING ORAL EVERY 8 HOURS PRN
Status: CANCELLED | OUTPATIENT
Start: 2023-10-04

## 2023-10-06 ENCOUNTER — HOSPITAL ENCOUNTER (OUTPATIENT)
Age: 68
Discharge: HOME | End: 2023-10-07
Attending: ORTHOPAEDIC SURGERY | Admitting: ORTHOPAEDIC SURGERY
Payer: MEDICARE

## 2023-10-06 ENCOUNTER — ANESTHESIA (OUTPATIENT)
Dept: SURGERY | Age: 68
End: 2023-10-06
Payer: MEDICARE

## 2023-10-06 ENCOUNTER — ANESTHESIA EVENT (OUTPATIENT)
Dept: SURGERY | Age: 68
End: 2023-10-06
Payer: MEDICARE

## 2023-10-06 ENCOUNTER — APPOINTMENT (OUTPATIENT)
Dept: GENERAL RADIOLOGY | Age: 68
End: 2023-10-06
Attending: ORTHOPAEDIC SURGERY
Payer: MEDICARE

## 2023-10-06 DIAGNOSIS — M19.012 PRIMARY OSTEOARTHRITIS OF LEFT SHOULDER: ICD-10-CM

## 2023-10-06 LAB
ABO + RH BLD: NORMAL
BLOOD GROUP ANTIBODIES SERPL: NORMAL
SPECIMEN EXP DATE BLD: NORMAL

## 2023-10-06 PROCEDURE — 6360000002 HC RX W HCPCS: Performed by: NURSE ANESTHETIST, CERTIFIED REGISTERED

## 2023-10-06 PROCEDURE — 73020 X-RAY EXAM OF SHOULDER: CPT

## 2023-10-06 PROCEDURE — 94761 N-INVAS EAR/PLS OXIMETRY MLT: CPT

## 2023-10-06 PROCEDURE — 7100000000 HC PACU RECOVERY - FIRST 15 MIN: Performed by: ORTHOPAEDIC SURGERY

## 2023-10-06 PROCEDURE — 2500000003 HC RX 250 WO HCPCS: Performed by: ANESTHESIOLOGY

## 2023-10-06 PROCEDURE — 2700000000 HC OXYGEN THERAPY PER DAY

## 2023-10-06 PROCEDURE — 2709999900 HC NON-CHARGEABLE SUPPLY: Performed by: ORTHOPAEDIC SURGERY

## 2023-10-06 PROCEDURE — 7100000001 HC PACU RECOVERY - ADDTL 15 MIN: Performed by: ORTHOPAEDIC SURGERY

## 2023-10-06 PROCEDURE — 86901 BLOOD TYPING SEROLOGIC RH(D): CPT

## 2023-10-06 PROCEDURE — 6370000000 HC RX 637 (ALT 250 FOR IP): Performed by: PHYSICIAN ASSISTANT

## 2023-10-06 PROCEDURE — 6360000002 HC RX W HCPCS: Performed by: ORTHOPAEDIC SURGERY

## 2023-10-06 PROCEDURE — 3700000000 HC ANESTHESIA ATTENDED CARE: Performed by: ORTHOPAEDIC SURGERY

## 2023-10-06 PROCEDURE — 6370000000 HC RX 637 (ALT 250 FOR IP): Performed by: ANESTHESIOLOGY

## 2023-10-06 PROCEDURE — L3660 SO 8 AB RSTR CAN/WEB PRE OTS: HCPCS | Performed by: ORTHOPAEDIC SURGERY

## 2023-10-06 PROCEDURE — 2580000003 HC RX 258: Performed by: NURSE ANESTHETIST, CERTIFIED REGISTERED

## 2023-10-06 PROCEDURE — 6360000002 HC RX W HCPCS: Performed by: ANESTHESIOLOGY

## 2023-10-06 PROCEDURE — 23472 RECONSTRUCT SHOULDER JOINT: CPT | Performed by: ORTHOPAEDIC SURGERY

## 2023-10-06 PROCEDURE — 94760 N-INVAS EAR/PLS OXIMETRY 1: CPT

## 2023-10-06 PROCEDURE — 2500000003 HC RX 250 WO HCPCS: Performed by: ORTHOPAEDIC SURGERY

## 2023-10-06 PROCEDURE — 86900 BLOOD TYPING SEROLOGIC ABO: CPT

## 2023-10-06 PROCEDURE — 20985 CPTR-ASST DIR MS PX: CPT | Performed by: ORTHOPAEDIC SURGERY

## 2023-10-06 PROCEDURE — C1776 JOINT DEVICE (IMPLANTABLE): HCPCS | Performed by: ORTHOPAEDIC SURGERY

## 2023-10-06 PROCEDURE — 64415 NJX AA&/STRD BRCH PLXS IMG: CPT | Performed by: ANESTHESIOLOGY

## 2023-10-06 PROCEDURE — 3600000015 HC SURGERY LEVEL 5 ADDTL 15MIN: Performed by: ORTHOPAEDIC SURGERY

## 2023-10-06 PROCEDURE — 86850 RBC ANTIBODY SCREEN: CPT

## 2023-10-06 PROCEDURE — 2580000003 HC RX 258: Performed by: ORTHOPAEDIC SURGERY

## 2023-10-06 PROCEDURE — 6370000000 HC RX 637 (ALT 250 FOR IP): Performed by: ORTHOPAEDIC SURGERY

## 2023-10-06 PROCEDURE — C1713 ANCHOR/SCREW BN/BN,TIS/BN: HCPCS | Performed by: ORTHOPAEDIC SURGERY

## 2023-10-06 PROCEDURE — 2500000003 HC RX 250 WO HCPCS: Performed by: NURSE ANESTHETIST, CERTIFIED REGISTERED

## 2023-10-06 PROCEDURE — 2580000003 HC RX 258: Performed by: ANESTHESIOLOGY

## 2023-10-06 PROCEDURE — 3700000001 HC ADD 15 MINUTES (ANESTHESIA): Performed by: ORTHOPAEDIC SURGERY

## 2023-10-06 PROCEDURE — 6360000002 HC RX W HCPCS: Performed by: PHYSICIAN ASSISTANT

## 2023-10-06 PROCEDURE — 3600000005 HC SURGERY LEVEL 5 BASE: Performed by: ORTHOPAEDIC SURGERY

## 2023-10-06 DEVICE — IMPLANTABLE DEVICE
Type: IMPLANTABLE DEVICE | Site: SHOULDER | Status: FUNCTIONAL
Brand: EQUINOXE

## 2023-10-06 DEVICE — IMPLANTABLE DEVICE: Type: IMPLANTABLE DEVICE | Site: SHOULDER | Status: FUNCTIONAL

## 2023-10-06 DEVICE — COMPONENT TOT SHLDR CAPPED S3EXACTECH] EXACTECH INC]: Type: IMPLANTABLE DEVICE | Status: FUNCTIONAL

## 2023-10-06 RX ORDER — CETIRIZINE HYDROCHLORIDE, PSEUDOEPHEDRINE HYDROCHLORIDE 5; 120 MG/1; MG/1
1 TABLET, FILM COATED, EXTENDED RELEASE ORAL 2 TIMES DAILY PRN
Status: DISCONTINUED | OUTPATIENT
Start: 2023-10-06 | End: 2023-10-07

## 2023-10-06 RX ORDER — SODIUM CHLORIDE 9 MG/ML
INJECTION, SOLUTION INTRAVENOUS PRN
Status: DISCONTINUED | OUTPATIENT
Start: 2023-10-06 | End: 2023-10-06 | Stop reason: HOSPADM

## 2023-10-06 RX ORDER — GLYCOPYRROLATE 0.2 MG/ML
INJECTION INTRAMUSCULAR; INTRAVENOUS PRN
Status: DISCONTINUED | OUTPATIENT
Start: 2023-10-06 | End: 2023-10-06 | Stop reason: SDUPTHER

## 2023-10-06 RX ORDER — VANCOMYCIN HYDROCHLORIDE 1 G/20ML
INJECTION, POWDER, LYOPHILIZED, FOR SOLUTION INTRAVENOUS PRN
Status: DISCONTINUED | OUTPATIENT
Start: 2023-10-06 | End: 2023-10-06 | Stop reason: ALTCHOICE

## 2023-10-06 RX ORDER — FENTANYL CITRATE 50 UG/ML
INJECTION, SOLUTION INTRAMUSCULAR; INTRAVENOUS PRN
Status: DISCONTINUED | OUTPATIENT
Start: 2023-10-06 | End: 2023-10-06 | Stop reason: SDUPTHER

## 2023-10-06 RX ORDER — SODIUM CHLORIDE 0.9 % (FLUSH) 0.9 %
5-40 SYRINGE (ML) INJECTION PRN
Status: DISCONTINUED | OUTPATIENT
Start: 2023-10-06 | End: 2023-10-06 | Stop reason: HOSPADM

## 2023-10-06 RX ORDER — MIDAZOLAM HYDROCHLORIDE 2 MG/2ML
2 INJECTION, SOLUTION INTRAMUSCULAR; INTRAVENOUS
Status: COMPLETED | OUTPATIENT
Start: 2023-10-06 | End: 2023-10-06

## 2023-10-06 RX ORDER — SODIUM CHLORIDE 0.9 % (FLUSH) 0.9 %
5-40 SYRINGE (ML) INJECTION EVERY 12 HOURS SCHEDULED
Status: DISCONTINUED | OUTPATIENT
Start: 2023-10-06 | End: 2023-10-06 | Stop reason: HOSPADM

## 2023-10-06 RX ORDER — ROCURONIUM BROMIDE 10 MG/ML
INJECTION, SOLUTION INTRAVENOUS PRN
Status: DISCONTINUED | OUTPATIENT
Start: 2023-10-06 | End: 2023-10-06 | Stop reason: SDUPTHER

## 2023-10-06 RX ORDER — PROCHLORPERAZINE EDISYLATE 5 MG/ML
5 INJECTION INTRAMUSCULAR; INTRAVENOUS
Status: DISCONTINUED | OUTPATIENT
Start: 2023-10-06 | End: 2023-10-06 | Stop reason: HOSPADM

## 2023-10-06 RX ORDER — NEOSTIGMINE METHYLSULFATE 1 MG/ML
INJECTION, SOLUTION INTRAVENOUS PRN
Status: DISCONTINUED | OUTPATIENT
Start: 2023-10-06 | End: 2023-10-06 | Stop reason: SDUPTHER

## 2023-10-06 RX ORDER — LIDOCAINE HYDROCHLORIDE 20 MG/ML
INJECTION, SOLUTION EPIDURAL; INFILTRATION; INTRACAUDAL; PERINEURAL PRN
Status: DISCONTINUED | OUTPATIENT
Start: 2023-10-06 | End: 2023-10-06 | Stop reason: SDUPTHER

## 2023-10-06 RX ORDER — BUPIVACAINE HYDROCHLORIDE AND EPINEPHRINE 5; 5 MG/ML; UG/ML
INJECTION, SOLUTION EPIDURAL; INTRACAUDAL; PERINEURAL
Status: COMPLETED | OUTPATIENT
Start: 2023-10-06 | End: 2023-10-06

## 2023-10-06 RX ORDER — LIDOCAINE HYDROCHLORIDE 10 MG/ML
1 INJECTION, SOLUTION INFILTRATION; PERINEURAL
Status: COMPLETED | OUTPATIENT
Start: 2023-10-06 | End: 2023-10-06

## 2023-10-06 RX ORDER — IPRATROPIUM BROMIDE AND ALBUTEROL SULFATE 2.5; .5 MG/3ML; MG/3ML
1 SOLUTION RESPIRATORY (INHALATION)
Status: DISCONTINUED | OUTPATIENT
Start: 2023-10-06 | End: 2023-10-06 | Stop reason: HOSPADM

## 2023-10-06 RX ORDER — DEXAMETHASONE SODIUM PHOSPHATE 10 MG/ML
INJECTION, SOLUTION INTRAMUSCULAR; INTRAVENOUS
Status: COMPLETED | OUTPATIENT
Start: 2023-10-06 | End: 2023-10-06

## 2023-10-06 RX ORDER — ROPIVACAINE HYDROCHLORIDE 5 MG/ML
INJECTION, SOLUTION EPIDURAL; INFILTRATION; PERINEURAL PRN
Status: DISCONTINUED | OUTPATIENT
Start: 2023-10-06 | End: 2023-10-06 | Stop reason: ALTCHOICE

## 2023-10-06 RX ORDER — EPINEPHRINE 1 MG/ML(1)
AMPUL (ML) INJECTION PRN
Status: DISCONTINUED | OUTPATIENT
Start: 2023-10-06 | End: 2023-10-06 | Stop reason: ALTCHOICE

## 2023-10-06 RX ORDER — ALPRAZOLAM 0.5 MG/1
0.5 TABLET ORAL NIGHTLY PRN
Status: DISCONTINUED | OUTPATIENT
Start: 2023-10-06 | End: 2023-10-07 | Stop reason: HOSPADM

## 2023-10-06 RX ORDER — SODIUM CHLORIDE 9 MG/ML
INJECTION, SOLUTION INTRAVENOUS PRN
Status: DISCONTINUED | OUTPATIENT
Start: 2023-10-06 | End: 2023-10-06

## 2023-10-06 RX ORDER — ACETAMINOPHEN 500 MG
1000 TABLET ORAL ONCE
Status: COMPLETED | OUTPATIENT
Start: 2023-10-06 | End: 2023-10-06

## 2023-10-06 RX ORDER — FENTANYL CITRATE 50 UG/ML
100 INJECTION, SOLUTION INTRAMUSCULAR; INTRAVENOUS
Status: COMPLETED | OUTPATIENT
Start: 2023-10-06 | End: 2023-10-06

## 2023-10-06 RX ORDER — PROPOFOL 10 MG/ML
INJECTION, EMULSION INTRAVENOUS PRN
Status: DISCONTINUED | OUTPATIENT
Start: 2023-10-06 | End: 2023-10-06 | Stop reason: SDUPTHER

## 2023-10-06 RX ORDER — SODIUM CHLORIDE, SODIUM LACTATE, POTASSIUM CHLORIDE, CALCIUM CHLORIDE 600; 310; 30; 20 MG/100ML; MG/100ML; MG/100ML; MG/100ML
INJECTION, SOLUTION INTRAVENOUS CONTINUOUS PRN
Status: DISCONTINUED | OUTPATIENT
Start: 2023-10-06 | End: 2023-10-06 | Stop reason: SDUPTHER

## 2023-10-06 RX ORDER — ESMOLOL HYDROCHLORIDE 10 MG/ML
INJECTION INTRAVENOUS PRN
Status: DISCONTINUED | OUTPATIENT
Start: 2023-10-06 | End: 2023-10-06 | Stop reason: SDUPTHER

## 2023-10-06 RX ORDER — ONDANSETRON 2 MG/ML
INJECTION INTRAMUSCULAR; INTRAVENOUS PRN
Status: DISCONTINUED | OUTPATIENT
Start: 2023-10-06 | End: 2023-10-06 | Stop reason: SDUPTHER

## 2023-10-06 RX ORDER — TRANEXAMIC ACID 100 MG/ML
INJECTION, SOLUTION INTRAVENOUS PRN
Status: DISCONTINUED | OUTPATIENT
Start: 2023-10-06 | End: 2023-10-06 | Stop reason: SDUPTHER

## 2023-10-06 RX ORDER — SODIUM CHLORIDE, SODIUM LACTATE, POTASSIUM CHLORIDE, CALCIUM CHLORIDE 600; 310; 30; 20 MG/100ML; MG/100ML; MG/100ML; MG/100ML
INJECTION, SOLUTION INTRAVENOUS CONTINUOUS
Status: DISCONTINUED | OUTPATIENT
Start: 2023-10-06 | End: 2023-10-06 | Stop reason: HOSPADM

## 2023-10-06 RX ORDER — HALOPERIDOL 5 MG/ML
1 INJECTION INTRAMUSCULAR
Status: DISCONTINUED | OUTPATIENT
Start: 2023-10-06 | End: 2023-10-06 | Stop reason: HOSPADM

## 2023-10-06 RX ORDER — ALBUTEROL SULFATE 2.5 MG/3ML
2.5 SOLUTION RESPIRATORY (INHALATION) EVERY 6 HOURS PRN
Status: DISCONTINUED | OUTPATIENT
Start: 2023-10-06 | End: 2023-10-07 | Stop reason: HOSPADM

## 2023-10-06 RX ORDER — EZETIMIBE 10 MG/1
10 TABLET ORAL NIGHTLY
Status: DISCONTINUED | OUTPATIENT
Start: 2023-10-06 | End: 2023-10-07 | Stop reason: HOSPADM

## 2023-10-06 RX ORDER — OXYCODONE HYDROCHLORIDE 5 MG/1
5 TABLET ORAL
Status: DISCONTINUED | OUTPATIENT
Start: 2023-10-06 | End: 2023-10-06 | Stop reason: HOSPADM

## 2023-10-06 RX ADMIN — SODIUM CHLORIDE, SODIUM LACTATE, POTASSIUM CHLORIDE, AND CALCIUM CHLORIDE: 600; 310; 30; 20 INJECTION, SOLUTION INTRAVENOUS at 11:03

## 2023-10-06 RX ADMIN — FENTANYL CITRATE 25 MCG: 50 INJECTION, SOLUTION INTRAMUSCULAR; INTRAVENOUS at 11:29

## 2023-10-06 RX ADMIN — PROPOFOL 160 MG: 10 INJECTION, EMULSION INTRAVENOUS at 11:10

## 2023-10-06 RX ADMIN — ROCURONIUM BROMIDE 10 MG: 50 INJECTION, SOLUTION INTRAVENOUS at 13:20

## 2023-10-06 RX ADMIN — EZETIMIBE 10 MG: 10 TABLET ORAL at 20:57

## 2023-10-06 RX ADMIN — BUPIVACAINE HYDROCHLORIDE AND EPINEPHRINE BITARTRATE 30 ML: 5; .005 INJECTION, SOLUTION EPIDURAL; INTRACAUDAL; PERINEURAL at 09:39

## 2023-10-06 RX ADMIN — FENTANYL CITRATE 75 MCG: 50 INJECTION, SOLUTION INTRAMUSCULAR; INTRAVENOUS at 12:06

## 2023-10-06 RX ADMIN — Medication 3 AMPULE: at 07:41

## 2023-10-06 RX ADMIN — LIDOCAINE HYDROCHLORIDE 1 ML: 10 INJECTION, SOLUTION INFILTRATION; PERINEURAL at 07:41

## 2023-10-06 RX ADMIN — ROCURONIUM BROMIDE 10 MG: 50 INJECTION, SOLUTION INTRAVENOUS at 12:32

## 2023-10-06 RX ADMIN — ACETAMINOPHEN 1000 MG: 500 TABLET, FILM COATED ORAL at 07:36

## 2023-10-06 RX ADMIN — PROPOFOL 20 MG: 10 INJECTION, EMULSION INTRAVENOUS at 12:38

## 2023-10-06 RX ADMIN — ROCURONIUM BROMIDE 10 MG: 50 INJECTION, SOLUTION INTRAVENOUS at 12:06

## 2023-10-06 RX ADMIN — TRANEXAMIC ACID 1000 MG: 100 INJECTION, SOLUTION INTRAVENOUS at 11:18

## 2023-10-06 RX ADMIN — Medication 2000 MG: at 11:03

## 2023-10-06 RX ADMIN — SODIUM CHLORIDE, POTASSIUM CHLORIDE, SODIUM LACTATE AND CALCIUM CHLORIDE: 600; 310; 30; 20 INJECTION, SOLUTION INTRAVENOUS at 07:41

## 2023-10-06 RX ADMIN — FENTANYL CITRATE 50 MCG: 50 INJECTION INTRAMUSCULAR; INTRAVENOUS at 09:40

## 2023-10-06 RX ADMIN — ROCURONIUM BROMIDE 50 MG: 50 INJECTION, SOLUTION INTRAVENOUS at 11:11

## 2023-10-06 RX ADMIN — LIDOCAINE HYDROCHLORIDE 60 MG: 20 INJECTION, SOLUTION EPIDURAL; INFILTRATION; INTRACAUDAL; PERINEURAL at 11:10

## 2023-10-06 RX ADMIN — Medication 3 MG: at 14:09

## 2023-10-06 RX ADMIN — PROPOFOL 20 MG: 10 INJECTION, EMULSION INTRAVENOUS at 12:39

## 2023-10-06 RX ADMIN — ROCURONIUM BROMIDE 10 MG: 50 INJECTION, SOLUTION INTRAVENOUS at 12:53

## 2023-10-06 RX ADMIN — ESMOLOL HYDROCHLORIDE 40 MG: 10 INJECTION INTRAVENOUS at 14:10

## 2023-10-06 RX ADMIN — GLYCOPYRROLATE 0.4 MG: 0.2 INJECTION INTRAMUSCULAR; INTRAVENOUS at 14:05

## 2023-10-06 RX ADMIN — ONDANSETRON 4 MG: 2 INJECTION INTRAMUSCULAR; INTRAVENOUS at 13:51

## 2023-10-06 RX ADMIN — MIDAZOLAM 2 MG: 1 INJECTION INTRAMUSCULAR; INTRAVENOUS at 09:39

## 2023-10-06 RX ADMIN — DEXAMETHASONE SODIUM PHOSPHATE 4 MG: 10 INJECTION, SOLUTION INTRAMUSCULAR; INTRAVENOUS at 09:39

## 2023-10-06 ASSESSMENT — PAIN - FUNCTIONAL ASSESSMENT: PAIN_FUNCTIONAL_ASSESSMENT: 0-10

## 2023-10-06 ASSESSMENT — LIFESTYLE VARIABLES: SMOKING_STATUS: 1

## 2023-10-06 ASSESSMENT — COPD QUESTIONNAIRES: CAT_SEVERITY: MILD

## 2023-10-06 NOTE — PERIOP NOTE
TRANSFER - OUT REPORT:    Verbal report given to [address] [place] [address] on Raheem Perez  being transferred to [address] [place] [address] for routine progression of patient care       Report consisted of patient's Situation, Background, Assessment and   Recommendations(SBAR). Information from the following report(s) Nurse Handoff Report, Adult Overview, Surgery Report, MAR, and Cardiac Rhythm SR  was reviewed with the receiving nurse. Lines:   Peripheral IV 10/06/23 Posterior;Right Wrist (Active)   Site Assessment Clean, dry & intact 10/06/23 1419   Line Status Infusing 10/06/23 [address] Connections checked and tightened 10/06/23 1419   Phlebitis Assessment No symptoms 10/06/23 1419   Infiltration Assessment 0 10/06/23 1419   Alcohol Cap Used No 10/06/23 1419   Dressing Status Clean, dry & intact 10/06/23 1419   Dressing Type Transparent 10/06/23 1419        Opportunity for questions and clarification was provided.       Patient transported with:  O2 @ 2lpm

## 2023-10-06 NOTE — PROGRESS NOTES
TRANSFER - IN REPORT:    Verbal report received from [id number] Pacheco RN on [employer]  being received from PACU for routine post-op      Report consisted of patient's Situation, Background, Assessment and   Recommendations(SBAR). Information from the following report(s) Nurse Handoff Report was reviewed with the receiving nurse. Opportunity for questions and clarification was provided. Assessment completed upon patient's arrival to unit and care assumed.

## 2023-10-06 NOTE — ANESTHESIA PROCEDURE NOTES
Peripheral Block    Patient location during procedure: pre-op  Reason for block: post-op pain management and at surgeon's request  Start time: 10/6/2023 9:39 AM  End time: 10/6/2023 9:42 AM  Staffing  Performed: anesthesiologist   Anesthesiologist: Chico Davenport MD  Performed by: Chico Davenport MD  Authorized by: Chico Davenport MD    Preanesthetic Checklist  Completed: patient identified, IV checked, site marked, risks and benefits discussed, surgical/procedural consents, equipment checked, pre-op evaluation, timeout performed, anesthesia consent given, oxygen available and monitors applied/VS acknowledged  Peripheral Block   Patient position: supine  Prep: ChloraPrep  Provider prep: mask  Patient monitoring: cardiac monitor, continuous pulse ox, frequent blood pressure checks, IV access and oxygen  Block type: Brachial plexus  Interscalene  Laterality: left  Injection technique: single-shot  Guidance: ultrasound guided    Needle   Needle type: insulated echogenic nerve stimulator needle   Needle gauge: 21 G  Needle localization: ultrasound guidance  Needle length: 10 cm  Assessment   Injection assessment: negative aspiration for heme, no paresthesia on injection, local visualized surrounding nerve on ultrasound and no intravascular symptoms  Paresthesia pain: none  Slow fractionated injection: yes  Hemodynamics: stable  Real-time US image taken/store: yes  Outcomes: uncomplicated and patient tolerated procedure well    Additional Notes  -Block placed for post op pain at surgeon's request.     -Ultrasound used to identify anatomy of nerve bundle. -Needle placement and local injection at perineural area confirmed with real time ultrasound guidance.     -Local visualized with ultrasound surrounding nerve. -Permanent Image taken and placed on chart.       Medications Administered  bupivacaine 0.5%-EPINEPHrine injection 1:200000 - Perineural   30 mL - 10/6/2023 9:39:00 AM  dexamethasone (DECADRON) (PF) 10 mg/mL

## 2023-10-06 NOTE — H&P
Name: Giovana Leon  YOB: 1955  Gender: male  MRN: [id number]    CC: Left shoulder(s) pain, stiffness    HPI:  This patient presents with a chronic history of left shoulder pain and limited motion. They have continued to suffer after exhausting all conservative options. We have reviewed all risks and benefits and have decided to proceed with shoulder replacement today. No Known Allergies  Past Medical History:   Diagnosis Date    Anxiety     Meds as needed    Arthritis     BPH (benign prostatic hyperplasia)     Depression     managed with meds    Helicobacter pylori infection     Hx of    Hypothyroid     Insomnia     Seasonal allergies     Stomach ulcer     Hx of     Past Surgical History:   Procedure Laterality Date    SHOULDER SURGERY Right 4/14/2023    RIGHT SHOULDER TOTAL ARTHROPLASTY REVERSE -REGIONAL BLOCK -23 HOUR OBSERVATION performed by Devin Yang MD at [address] [place] [address]       No family history on file. Social History     Socioeconomic History    Marital status:       Spouse name: Not on file    Number of children: Not on file    Years of education: Not on file    Highest education level: Not on file   Occupational History    Not on file   Tobacco Use    Smoking status: Every Day     Packs/day: 0.50     Years: 51.00     Additional pack years: 0.00     Total pack years: 25.50     Types: Cigarettes    Smokeless tobacco: Never   Vaping Use    Vaping Use: Never used   Substance and Sexual Activity    Alcohol use: Not Currently    Drug use: Not Currently    Sexual activity: Not on file   Other Topics Concern    Not on file   Social History Narrative    Not on file     Social Determinants of Health     Financial Resource Strain: Not on file   Food Insecurity: Not on file   Transportation Needs: Not on file   Physical Activity: Not on file   Stress: Not on file   Social Connections: Not on file   Intimate Partner Violence: Not on file   Housing Stability: subpectoral tenodesis, after they have exhausted all conservative options. I talked with them extensively about the risks, benefits, reasonable expectations and expected recovery time as well as possible complications including but not limited to bleeding, infection, wear of the components requiring revision, neurovascular injury, instability/dislocations, cosmetic deformity, stiffness, pain, continued problems, DVT, PE, hardware failure,  fracture, heterotopic ossification, MI and other anesthesia related risks, etc.   In the office I gave them education literature and answered their questions. They seem to understand and wish to proceed. The patient was counseled at length about the risks of susan Covid-19 during their perioperative period and any recovery window from their procedure. The patient was made aware that susan Covid-19  may worsen their prognosis for recovering from their procedure and lend to a higher morbidity and/or mortality risk. All material risks, benefits, and reasonable alternatives including postponing the procedure were discussed. The patient  wishes to proceed with the procedure at this time.         Marya Duff MD  10/06/23

## 2023-10-06 NOTE — OP NOTE
Operative Note    Date of Surgery: 10/6/2023       Preoperative diagnosis:  Pre-Op Diagnosis Codes:     * Primary osteoarthritis of left shoulder [M19.012]     * Left rotator cuff tear arthropathy [M75.102, M12.812]    Postoperative diagnosis: Left severe rotator cuff tear arthropathy    Surgeon(s) and Role:     Vera Neely MD - Primary     Assistant: first bruna Kam, assisted during the procedure. She was necessary for patient positioning, wound closure, and assistance with the major portions of the operation. Her presence decreased the operative time and potential complication rate. Anesthesia: General, regional block    Antibiotics:  Ancef 2 grams IV, vancomycin powder 1 gram.    Procedures:  Procedure(s):  LEFT SHOULDER TOTAL ARTHROPLASTY REVERSE -REGIONAL BLOCK -23 HOUR OBSERVATION  left Reverse Total Shoulder 21191  72796- Other procedures on musculoskeletal system            0055T- Computer assisted surgical navigation       Findings:  1. EUA -  Flexion 80 abduction 60 external rotation -15   2. Joint -severe rotator cuff arthropathy with severe posterior basilar insufficiency with 70% posteriorly. Large multiloculated presents with bleeding superiorly underneath the coracoid. Patient severe rotator cuff tissue. Indications / Consent: This is a patient who has continued to have poor function and pain of the left shoulder and at this point it was felt that a reverse shoulder prosthesis was a reasonable option. After previous discussions and treatments using both conservative and/or non-operative treatment options the patient elected to proceed with surgery due to continued symptoms. A review of the risks and benefits, including but not limited to infection, stiffness, injury to nerves and vessels, DVT, PE, MI, need for further operations and other anesthesia related risks was performed with the patient.  After this review and the review of the likely outcome and potential Implanted   KIT BONE SCR L38MM DIA4.5MM MATIAS SHLDR GRN COMPR LCK CAP RVS - [id number]  KIT BONE SCR L38MM DIA4.5MM MATIAS SHLDR GRN COMPR LCK CAP RVS [id number] [employer] [id number] Left 1 Implanted   KIT BONE SCR L18MM DIA4.5MM MATIAS SHLDR WHT COMPR LCK CAP RVS - [id number]  KIT BONE SCR L18MM DIA4.5MM MATIAS SHLDR WHT COMPR LCK CAP RVS [id number] [employer]- [id number] Left 1 Implanted   KIT BNE SCR L30MM DIA4.5MM MATIAS SHLDR YAZAN COMPR MANUEL CAP REV - [id number]  KIT BNE SCR L30MM DIA4.5MM MATIAS SHLDR YAZAN COMPR MANUEL CAP REV [id number] [employer]- [id number] Left 1 Implanted   KIT BNE SCR L34MM DIA4.5MM MATIAS SHLDR RED COMPR MANUEL CAP REV - [id number]  KIT BNE SCR L34MM DIA4.5MM MATIAS SHLDR RED COMPR MANUEL CAP REV [id number] [employer] [id number] Left 1 Implanted   equinoxe reverse shoulder glenosphere and extended locking caps   [id number]  [id number] Left 1 Implanted   STEM HUM SHT 14 MM SHLDR PRESERVE E[employer]IN[employer] - [id number]  STEM HUM SHT 14 MM SHLDR PRESERVE EQUINOXE [id number] [employer] [id number] Left 1 Implanted   LINER HUM [id number] +2.5MM OFFSET STD POLY FOR RVS SHLDR SYS - [id number]  LINER HUM [id number] +2.5MM OFFSET STD POLY FOR RVS SHLDR SYS [id number] [employer] [id number] Left 1 Implanted   TRAY HUM ADPT +5MM OFFSET STD POLY FOR REV SHLDR SYS - [id number]  TRAY HUM ADPT +5MM OFFSET STD POLY FOR REV SHLDR SYS [id number] [employer]- [id number] Left 1 Implanted       Closure: Primary    Complications: None    Signed By: Edinson Jin MD

## 2023-10-06 NOTE — PERIOP NOTE
MD Smith Dural  at bedside with patient. Pt VSS stable. Pain and Nausea controlled at this time. Verbal sign out per MD when pacu care is completed. Plan of care continues.

## 2023-10-06 NOTE — ANESTHESIA POSTPROCEDURE EVALUATION
Department of Anesthesiology  Postprocedure Note    Patient: Giovana Leon  MRN: [id number]  YOB: 1955  Date of evaluation: 10/6/2023      Procedure Summary     Date: 10/06/23 Room / Location: [facility] MAIN OR 06 / [facility] MAIN OR    Anesthesia Start: 1048 Anesthesia Stop: 1419    Procedure: LEFT SHOULDER TOTAL ARTHROPLASTY REVERSE -REGIONAL BLOCK -23 HOUR OBSERVATION (Left: Shoulder) Diagnosis:       Primary osteoarthritis of left shoulder      Left rotator cuff tear arthropathy      (Primary osteoarthritis of left shoulder [M19.012])      (Left rotator cuff tear arthropathy [M75.102, M12.812])    Surgeons: Devin Yang MD Responsible Provider: Funmi Islas MD    Anesthesia Type: General ASA Status: 2          Anesthesia Type: General    Paul Phase I: Paul Score: 8    Paul Phase II:        Anesthesia Post Evaluation    Patient location during evaluation: PACU  Patient participation: complete - patient participated  Level of consciousness: awake  Pain score: 0  Airway patency: patent  Nausea & Vomiting: no nausea  Complications: no  Cardiovascular status: hemodynamically stable  Respiratory status: acceptable and nonlabored ventilation  Hydration status: stable  Multimodal analgesia pain management approach  Pain management: adequate

## 2023-10-06 NOTE — ANESTHESIA PRE PROCEDURE
Department of Anesthesiology  Preprocedure Note       Name:  Sirisha Crum   Age:  77 y.o.  :  1955                                          MRN:  [id number]         Date:  10/6/2023      Surgeon: Shanelle Shields):  Raheel Perla MD    Procedure: Procedure(s):  LEFT SHOULDER TOTAL ARTHROPLASTY REVERSE -REGIONAL BLOCK -23 HOUR OBSERVATION    Medications prior to admission:   Prior to Admission medications    Medication Sig Start Date End Date Taking? Authorizing Provider   aspirin 325 MG tablet Take 1 tablet by mouth in the morning and at bedtime for 7 days To start after surgery 10/4/23 10/11/23  DELMY Patricio   naloxegol (MOVANTIK) 25 MG TABS tablet Take 1 tablet by mouth every morning  Patient not taking: Reported on 10/6/2023 10/4/23   DELMY Patricio   naloxone [facility]) 4 MG/0.1ML LIQD nasal spray 1 spray by Nasal route as needed for Opioid Reversal  Patient not taking: Reported on 10/6/2023 10/4/23   DELMY Patricio   oxyCODONE-acetaminophen (PERCOCET) 7.5-325 MG per tablet Take 1-2 tablets by mouth every 4-6 hours as needed for Pain for up to 3 days. Start medication night of surgery. Max Daily Amount: 12 tablets 10/4/23 10/7/23  DELMY Patricio   senna-docusate (PERICOLACE) 8.6-50 MG per tablet Take 1 tablet by mouth daily Take for constipation prophylaxis  Patient not taking: Reported on 10/6/2023 10/4/23   DELMY Patricio   ondansetron (ZOFRAN-ODT) 8 MG TBDP disintegrating tablet Place 0.5 tablets under the tongue in the morning and 0.5 tablets at noon and 0.5 tablets in the evening and 0.5 tablets before bedtime. Take 20 minutes prior to pain medication for nausea prevention. Patient not taking: Reported on 10/6/2023 10/4/23   DELMY Patricio   levothyroxine (SYNTHROID) 112 MCG tablet Take 1 tablet by mouth daily  Patient not taking: Reported on 10/6/2023 6/6/23   Provider, MD Shahana   ezetimibe (ZETIA) 10 MG tablet TAKE 1 TABLET (10 MG) BY MOUTH DAILY.  23   Provider,

## 2023-10-07 VITALS
DIASTOLIC BLOOD PRESSURE: 75 MMHG | HEART RATE: 98 BPM | BODY MASS INDEX: 26.84 KG/M2 | WEIGHT: 191.7 LBS | HEIGHT: 71 IN | OXYGEN SATURATION: 92 % | RESPIRATION RATE: 16 BRPM | TEMPERATURE: 98.1 F | SYSTOLIC BLOOD PRESSURE: 120 MMHG

## 2023-10-07 PROCEDURE — 97530 THERAPEUTIC ACTIVITIES: CPT

## 2023-10-07 PROCEDURE — 6370000000 HC RX 637 (ALT 250 FOR IP): Performed by: PHYSICIAN ASSISTANT

## 2023-10-07 PROCEDURE — 97161 PT EVAL LOW COMPLEX 20 MIN: CPT

## 2023-10-07 PROCEDURE — 94760 N-INVAS EAR/PLS OXIMETRY 1: CPT

## 2023-10-07 PROCEDURE — 97535 SELF CARE MNGMENT TRAINING: CPT

## 2023-10-07 PROCEDURE — 6370000000 HC RX 637 (ALT 250 FOR IP): Performed by: ORTHOPAEDIC SURGERY

## 2023-10-07 PROCEDURE — 97165 OT EVAL LOW COMPLEX 30 MIN: CPT

## 2023-10-07 PROCEDURE — 2580000003 HC RX 258: Performed by: PHYSICIAN ASSISTANT

## 2023-10-07 PROCEDURE — 6360000002 HC RX W HCPCS: Performed by: PHYSICIAN ASSISTANT

## 2023-10-07 RX ORDER — OXYCODONE AND ACETAMINOPHEN 7.5; 325 MG/1; MG/1
1 TABLET ORAL EVERY 4 HOURS PRN
Status: DISCONTINUED | OUTPATIENT
Start: 2023-10-07 | End: 2023-10-07 | Stop reason: HOSPADM

## 2023-10-07 RX ORDER — ONDANSETRON 2 MG/ML
4 INJECTION INTRAMUSCULAR; INTRAVENOUS EVERY 6 HOURS PRN
Status: DISCONTINUED | OUTPATIENT
Start: 2023-10-07 | End: 2023-10-07 | Stop reason: HOSPADM

## 2023-10-07 RX ORDER — ONDANSETRON 4 MG/1
4 TABLET, ORALLY DISINTEGRATING ORAL EVERY 8 HOURS PRN
Status: DISCONTINUED | OUTPATIENT
Start: 2023-10-07 | End: 2023-10-07 | Stop reason: HOSPADM

## 2023-10-07 RX ORDER — OXYCODONE AND ACETAMINOPHEN 7.5; 325 MG/1; MG/1
2 TABLET ORAL EVERY 4 HOURS PRN
Status: DISCONTINUED | OUTPATIENT
Start: 2023-10-07 | End: 2023-10-07 | Stop reason: HOSPADM

## 2023-10-07 RX ORDER — DIPHENHYDRAMINE HCL 25 MG
25 CAPSULE ORAL EVERY 6 HOURS PRN
Status: DISCONTINUED | OUTPATIENT
Start: 2023-10-07 | End: 2023-10-07 | Stop reason: HOSPADM

## 2023-10-07 RX ORDER — HYDROMORPHONE HYDROCHLORIDE 1 MG/ML
0.5 INJECTION, SOLUTION INTRAMUSCULAR; INTRAVENOUS; SUBCUTANEOUS
Status: DISCONTINUED | OUTPATIENT
Start: 2023-10-07 | End: 2023-10-07 | Stop reason: HOSPADM

## 2023-10-07 RX ORDER — DIPHENHYDRAMINE HYDROCHLORIDE 50 MG/ML
25 INJECTION INTRAMUSCULAR; INTRAVENOUS EVERY 6 HOURS PRN
Status: DISCONTINUED | OUTPATIENT
Start: 2023-10-07 | End: 2023-10-07 | Stop reason: HOSPADM

## 2023-10-07 RX ORDER — HYDROMORPHONE HYDROCHLORIDE 1 MG/ML
1 INJECTION, SOLUTION INTRAMUSCULAR; INTRAVENOUS; SUBCUTANEOUS
Status: DISCONTINUED | OUTPATIENT
Start: 2023-10-07 | End: 2023-10-07 | Stop reason: HOSPADM

## 2023-10-07 RX ORDER — ASPIRIN 325 MG
325 TABLET, DELAYED RELEASE (ENTERIC COATED) ORAL 2 TIMES DAILY
Status: DISCONTINUED | OUTPATIENT
Start: 2023-10-07 | End: 2023-10-07 | Stop reason: HOSPADM

## 2023-10-07 RX ADMIN — NALOXEGOL OXALATE 25 MG: 25 TABLET, FILM COATED ORAL at 09:08

## 2023-10-07 RX ADMIN — ASPIRIN 325 MG: 325 TABLET, COATED ORAL at 09:07

## 2023-10-07 RX ADMIN — CEFAZOLIN 2000 MG: 10 INJECTION, POWDER, FOR SOLUTION INTRAVENOUS at 09:08

## 2023-10-07 RX ADMIN — OXYCODONE AND ACETAMINOPHEN 2 TABLET: 7.5; 325 TABLET ORAL at 09:07

## 2023-10-07 ASSESSMENT — PAIN DESCRIPTION - ORIENTATION
ORIENTATION: RIGHT
ORIENTATION: RIGHT

## 2023-10-07 ASSESSMENT — PAIN SCALES - GENERAL
PAINLEVEL_OUTOF10: 6
PAINLEVEL_OUTOF10: 3
PAINLEVEL_OUTOF10: 3

## 2023-10-07 ASSESSMENT — PAIN DESCRIPTION - LOCATION
LOCATION: SHOULDER
LOCATION: SHOULDER

## 2023-10-07 ASSESSMENT — PAIN DESCRIPTION - PAIN TYPE: TYPE: SURGICAL PAIN

## 2023-10-07 ASSESSMENT — PAIN DESCRIPTION - DESCRIPTORS: DESCRIPTORS: ACHING

## 2023-10-07 NOTE — PROGRESS NOTES
Exercises  [x]  Sling Application [x]  Movement Precautions   []  Pulleys [x]  Use of Ice  []  Other:      AFTER TREATMENT PRECAUTIONS: Bed/Chair Locked, Call light within reach, Chair, Needs within reach, and RN notified    INTERDISCIPLINARY COLLABORATION:  RN/ PCT and OT/ [facility]    COMPLIANCE WITH PROGRAM/EXERCISES: compliant all of the time. RECOMMENDATIONS/INTENT FOR NEXT TREATMENT SESSION: Treatment next visit will focus on increasing Mr. Teresa Buck independence with bed mobility, transfers, gait training, strength/ROM exercises, modalities for pain, and patient education. TIME IN/OUT:  Time In: 9659  Time Out: 1030  Minutes: 120 [facility]

## 2023-10-07 NOTE — CARE COORDINATION
77 yr-old M with shoulder surgery with Dr. Laverne Caballero. Patient's brother will assist at discharge. No needs. 10/07/23 3062   Service Assessment   Patient Orientation Alert and Oriented   Cognition Alert   History Provided By Patient   Primary [address] [place] Family Members   Patient's Healthcare Decision Maker is: Legal Next of [facility]   PCP Verified by CM Yes   Last Visit to PCP Within last 6 months   Prior Functional Level Independent in ADLs/IADLs   Current Functional Level Independent in ADLs/IADLs   Can patient return to prior living arrangement Yes   Ability to make needs known: Good   Family able to assist with home care needs: Yes   Would you like for me to discuss the discharge plan with any other family members/significant others, and if so, who?  No   Financial Resources [employer] Other (Comment)  (n/a)

## 2023-10-07 NOTE — DISCHARGE SUMMARY
[facility],2[address] Floor Orthopedics   Discharge Summary         Patient ID:  Leslye Arredondo  [id number]  77 y.o.  1955    Admit date: 10/6/2023  Discharge date and time:    Admitting Physician: Kelly Sumner MD  [facility]    Admission Diagnoses: Pre op diagnosis: Primary osteoarthritis of left shoulder [M19.012]  Left rotator cuff tear arthropathy [M75.102, M12.812]  Prior to surgery the patient was seen for consultation in the office or hospital and a complete history and physical was taken as it pertained to their condition. Discharge Diagnoses: Left rotator cuff tear arthropathy. Chronic and Acute medical problems addressed during this hospital stay by consulting physicians include: They underwent Procedure(s) (LRB):  LEFT SHOULDER TOTAL ARTHROPLASTY REVERSE -REGIONAL BLOCK -23 HOUR OBSERVATION (Left) for this. Principle Problem: Left rotator cuff tear arthropathy. Other Chronic and Acute Medical Issues: managed by the hospitalist during admission included: Principal Problem:    Left rotator cuff tear arthropathy  Active Problems:    Primary osteoarthritis of left shoulder    Arthritis of left shoulder region  Resolved Problems:    * No resolved hospital problems. *                               Perioperative Antibiotics:  Prior to surgery Ancef 1 to 2 mg was given depending on patient's weight and allergies.   If the patient was allergic to Ancef or MRSA positive  the patient was given Vancomycin and Cleocin        Hospital Medications:   Current Facility-Administered Medications   Medication Dose Route Frequency    ceFAZolin (ANCEF) 2000 mg in sterile water 20 mL IV syringe  2,000 mg IntraVENous Q8H    HYDROmorphone HCl PF (DILAUDID) injection 0.5 mg  0.5 mg IntraVENous Q3H PRN    Or    HYDROmorphone HCl PF (DILAUDID) injection 1 mg  1 mg IntraVENous Q3H PRN    ondansetron (ZOFRAN-ODT) disintegrating tablet 4 mg  4 mg Oral Q8H PRN    Or    ondansetron (ZOFRAN) injection 4 mg  4 mg IntraVENous Q6H PRN    aspirin EC tablet 325 mg  325 mg Oral BID    diphenhydrAMINE (BENADRYL) capsule 25 mg  25 mg Oral Q6H PRN    Or    diphenhydrAMINE (BENADRYL) injection 25 mg  25 mg IntraVENous Q6H PRN    oxyCODONE-acetaminophen (PERCOCET) 7.5-325 MG per tablet 1 tablet  1 tablet Oral Q4H PRN    Or    oxyCODONE-acetaminophen (PERCOCET) 7.5-325 MG per tablet 2 tablet  2 tablet Oral Q4H PRN    albuterol (PROVENTIL) (2.5 MG/3ML) 0.083% nebulizer solution 2.5 mg  2.5 mg Nebulization Q6H PRN    ALPRAZolam (XANAX) tablet 0.5 mg  0.5 mg Oral Nightly PRN    ezetimibe (ZETIA) tablet 10 mg  10 mg Oral Nightly    naloxegol (MOVANTIK) tablet 25 mg  25 mg Oral QAM         Additional DVT Prophylaxis:  COOPER Hose, SCDs     Postoperative Blood Report: If the patient received blood products during their admission they are listed below:     No components found for: \"PCTEXX\"  No components found for: \"PCTABR\"  Lab Results   Component Value Date/Time    ABORH A POSITIVE 10/06/2023 07:29 AM     No components found for: \"PCTUN\"  No components found for: \"PCTCT\"  No components found for: \"PCTUDIV\"  No components found for: \"PCTXM\"    Post Op complications: none       Physical Therapy: PT was started on the day of surgery and progressed. Disposition: Good    Upon Discharge: The wound appears to be healing without any evidence of infection. Pt Discharged to: [facility]ju[facility]     Discharge Medications:      Medication List        START taking these medications      naloxegol 25 MG Tabs tablet  Commonly known as: Movantik  Take 1 tablet by mouth every morning     senna-docusate 8.6-50 MG per tablet  Commonly known as: PERICOLACE  Take 1 tablet by mouth daily Take for constipation prophylaxis            CONTINUE taking these medications      ALPRAZolam 0.5 MG tablet  Commonly known as: XANAX     aspirin 325 MG tablet  Take 1 tablet by mouth in the morning and at bedtime for 7 days To start after surgery     cetirizine-psuedoephedrine 5-120 MG

## 2023-10-07 NOTE — PROGRESS NOTES
10/06/23 2124   Oxygen Therapy/Pulse Ox   O2 Therapy Oxygen   $Oxygen $Daily Charge   O2 Device Nasal cannula   O2 Flow Rate (L/min) 3 L/min   Pulse 89   SpO2 93 %   Skin Assessment Clean, dry, & intact   Pulse Oximeter Device Mode Continuous   Pulse Oximeter Device Location Left;Finger   $Pulse Oximeter $Spot check (multiple/continuous)     Pt on continuous monitor for HS. Alarm limits set. Pt working on IS. Pt on 3L NC for tonight. Pt refused to wear HFNC for tonight.

## 2023-10-16 NOTE — PROGRESS NOTES
Name: Juan Zhou  YOB: 1955  Gender: male  MRN: [id number]    CC:   Chief Complaint   Patient presents with    Shoulder Pain     First post op left reverse TSA - DOS 10/6/23   , Patient is here to follow-up one week status post left reverse TSA. Left Shoulder Total Arthroplasty Reverse -regional Block -23 Hour Observation - Left  10/6/2023    HPI: The patient is doing well and as expected. The patient has been following protocol and comes into the office today with use of the sling. Review of Systems  Noncontributory     PE left shoulder: Operative shoulder exam:  The incisions are clean, dry and intact. There is no sign of infection. The axillary sensation is intact. The deltoid muscle has good firing. The shoulder is supple. They are neurovascularly intact. Range of Motion was not tested today. X-ray:  AP and Axillary of the left shoulder views show intact prosthesis and the components in place. No fractures are evident. AP:     ICD-10-CM    1. Left rotator cuff tear arthropathy  M75.102 XR SHOULDER LEFT (MIN 2 VIEWS)    M12.812 Ambulatory referral to Physical Therapy      2. Primary osteoarthritis of left shoulder  M19.012 XR SHOULDER LEFT (MIN 2 VIEWS)     Ambulatory referral to Physical Therapy      3. S/P reverse total shoulder arthroplasty, left  Z96.612 XR SHOULDER LEFT (MIN 2 VIEWS)     Ambulatory referral to Physical Therapy      4. Surgery follow-up  Z09 XR SHOULDER LEFT (MIN 2 VIEWS)     Ambulatory referral to Physical Therapy        The patient is doing well one week status post TSA. They were given a Physical Therapy prescription and protocol for a leftTotal Shoulder Arthroplasty. They will begin physical therapy this week if they have not already started. Return in about 3 weeks (around 11/8/2023) for TSA x-ray fu [place] / Axillary. They need to return to the clinic in 4 weeks time for re-evaluation and repeat xrays.      Ana Cristina De Paz MD  10/18/23

## 2023-10-18 ENCOUNTER — OFFICE VISIT (OUTPATIENT)
Dept: ORTHOPEDIC SURGERY | Age: 68
End: 2023-10-18

## 2023-10-18 DIAGNOSIS — M19.012 PRIMARY OSTEOARTHRITIS OF LEFT SHOULDER: ICD-10-CM

## 2023-10-18 DIAGNOSIS — Z96.612 S/P REVERSE TOTAL SHOULDER ARTHROPLASTY, LEFT: ICD-10-CM

## 2023-10-18 DIAGNOSIS — M12.812 LEFT ROTATOR CUFF TEAR ARTHROPATHY: Primary | ICD-10-CM

## 2023-10-18 DIAGNOSIS — Z09 SURGERY FOLLOW-UP: ICD-10-CM

## 2023-10-18 DIAGNOSIS — M75.102 LEFT ROTATOR CUFF TEAR ARTHROPATHY: Primary | ICD-10-CM

## 2023-10-23 ENCOUNTER — HOSPITAL ENCOUNTER (OUTPATIENT)
Dept: PHYSICAL THERAPY | Age: 68
Setting detail: RECURRING SERIES
Discharge: HOME | End: 2023-10-26
Attending: ORTHOPAEDIC SURGERY
Payer: MEDICARE

## 2023-10-23 DIAGNOSIS — M25.612 STIFFNESS OF LEFT SHOULDER, NOT ELSEWHERE CLASSIFIED: ICD-10-CM

## 2023-10-23 DIAGNOSIS — M25.512 LEFT SHOULDER PAIN, UNSPECIFIED CHRONICITY: Primary | ICD-10-CM

## 2023-10-23 PROCEDURE — 97162 PT EVAL MOD COMPLEX 30 MIN: CPT

## 2023-10-23 PROCEDURE — 97110 THERAPEUTIC EXERCISES: CPT

## 2023-10-23 ASSESSMENT — PAIN SCALES - GENERAL: PAINLEVEL_OUTOF10: 1

## 2023-10-23 NOTE — THERAPY EVALUATION
Arie Burdick  : 1955  Primary: [address] [facility] Medicare Advantage (Medicare Managed)  Secondary:  [address] @ [facility]  [address] [facility] 200  [place] [address]  Phone: [phone]  Fax: [phone] Plan Frequency: 2x/wk for 90 days    Plan of Care/Certification Expiration Date: 24      PT Visit Info:  Plan Frequency: 2x/wk for 90 days  Plan of Care/Certification Expiration Date: 24  Total # of Visits to Date: 1  Progress Note Counter: 1      Visit Count:  1                OUTPATIENT PHYSICAL THERAPY:             Initial Assessment 10/23/2023               Episode (PT: s/p Left Reverse TSA) Appt Desk         Treatment Diagnosis:    Left shoulder pain, unspecified chronicity  Stiffness of left shoulder, not elsewhere classified  Medical/Referring Diagnosis:      Referring Physician:  Saud Werner MD MD Orders:  PT Eval and Treat   Return MD Appt:  23  Date of Onset:    s/p Left Reverse TSA 10-06-23  Allergies:  Patient has no known allergies. Restrictions/Precautions:      S/p Left Reverse TSA 10-06-23     Medications Last Reviewed:  10/23/2023     SUBJECTIVE   History of Injury/Illness (Reason for Referral):  Pt had chronic bilateral shoulder OA. He had R shoulder Reverse TSA 2023. He then had surgery 10-06-23 for Left Reverse TSA. He spent one night in the hospital, then returned home. He rates his current R shoulder pain 1/10 sitting at rest, increasing to 4/10 at worst over the past week. He is taking Percocet and Aspirin prn for his L shoulder pain. He is R-handed. [facility] Pt is retired.   Patient Stated Goal(s):  to decrease pain, weakness and stiffness L shoulder  Initial:     1/10 Post Session:     1/10  Past Medical History/Comorbidities:   Mr. Daina Wooten  has a past medical history of Anxiety, Arthritis, BPH (benign prostatic hyperplasia), Depression, Helicobacter pylori infection, Hypothyroid,

## 2023-10-25 ENCOUNTER — HOSPITAL ENCOUNTER (OUTPATIENT)
Dept: PHYSICAL THERAPY | Age: 68
Setting detail: RECURRING SERIES
Discharge: HOME | End: 2023-10-28
Attending: ORTHOPAEDIC SURGERY
Payer: MEDICARE

## 2023-10-25 PROCEDURE — 97110 THERAPEUTIC EXERCISES: CPT

## 2023-10-25 NOTE — PROGRESS NOTES
Victor Manuel Tan  : 1955  Primary: [address] Medicare Advantage (Medicare Managed)  Secondary:  [address] @ [facility]  508 [facility] 200  [address] [place] [address]  Phone: [phone]  Fax: [phone] Plan Frequency: 2x/wk for 90 days  Plan of Care/Certification Expiration Date: 24      >PT Visit Info:  Plan Frequency: 2x/wk for 90 days  Plan of Care/Certification Expiration Date: 24  Total # of Visits to Date: 22  Progress Note Counter: 6      Visit Count:  2    OUTPATIENT PHYSICAL THERAPY: Treatment Note 10/25/2023       Episode  }Appt Desk             Treatment Diagnosis:      Left shoulder pain, unspecified chronicity  Stiffness of left shoulder, not elsewhere classified  SURGERY 10/6/23  No data found  Medical/Referring Diagnosis:      Referring Physician:  Nancie Meza MD MD Orders:  PT Eval and Treat   Date of Onset:  Onset Date: 23     Allergies:   Patient has no known allergies. Restrictions/Precautions:  Restrictions/Precautions: Weight Bearing  Right Upper Extremity Weight Bearing: Non Weight Bearing  Left Upper Extremity Weight Bearing: Non Weight Bearing     Interventions Planned (Treatment may consist of any combination of the following):    Current Treatment Recommendations: Strengthening; ROM; Transfer training; Manual; Pain management; Home exercise program; Patient/Caregiver education & training; Modalities     >Subjective Comments:  \"I am doing pretty good\"     >Initial:  3    /10>Post Session:     3   /10  Medications Last Reviewed:  10/25/2023  Updated Objective Findings:  None Today  Treatment   THERAPEUTIC EXERCISE: (38 minutes):    Exercises per grid below to improve mobility and strength. Required minimal verbal cues to promote proper body alignment and promote proper body posture. Progressed resistance and repetitions as indicated.    Date:  10-25-23 Date:   Date:     Activity/Exercise Parameters Parameters Parameters   AROM

## 2023-10-30 ENCOUNTER — HOSPITAL ENCOUNTER (OUTPATIENT)
Dept: PHYSICAL THERAPY | Age: 68
Setting detail: RECURRING SERIES
Discharge: HOME | End: 2023-11-02
Attending: ORTHOPAEDIC SURGERY
Payer: MEDICARE

## 2023-10-30 PROCEDURE — 97110 THERAPEUTIC EXERCISES: CPT

## 2023-10-30 NOTE — PROGRESS NOTES
Date:   Date:     Activity/Exercise Parameters Parameters Parameters   AROM Gripping L Hand 20 reps     AROM L Wrtist Flexion/Extension 20 reps each     AROM L Wrist Radial Deviation/Ulnar Deviation 20 reps each     Scapular Retraction 15 reps     Pendulum Exercises Circles CW/CCW  20 reps each     PROM to Elbow into Flexion/Extension and Forearm into Pronation/Supination L UE     PROM to L Shoulder Flexion to 90 degrees; External Rotation to 0 degrees             OM L shoulder as tolerable per protocol. Patient did well today in PT. Following Protocol.       >Total Treatment Billable Duration:  38 minutes  Time In: 1345  Time Out: [address] [facility] [address], PTA       Charge Capture  }Post Session Pain  PT Visit Info  [facility] Portal  MD Guidelines  Scanned Media  Benefits  MyChart    Future Appointments   Date Time Provider [address]   11/1/2023 11:00 AM Milli Rodriguez PTA [facility] SFE   11/6/2023 11:00 AM Eleni Ash, PT [facility] SFE   11/7/2023  2:10 PM Justine Rios MD [facility] AMB   11/8/2023 11:00 AM JEMIMA LoveEORPT SFE   11/13/2023 11:00 AM Milli Rodriguez PTA [facility] SFE   11/15/2023 11:00 AM Eleni Ash PT [facility] SFE

## 2023-11-01 ENCOUNTER — HOSPITAL ENCOUNTER (OUTPATIENT)
Dept: PHYSICAL THERAPY | Age: 68
Setting detail: RECURRING SERIES
Discharge: HOME | End: 2023-11-04
Attending: ORTHOPAEDIC SURGERY
Payer: MEDICARE

## 2023-11-01 PROCEDURE — 97110 THERAPEUTIC EXERCISES: CPT

## 2023-11-01 NOTE — PROGRESS NOTES
Nelly Blake  : 1955  Primary: [address] Medicare Advantage (Medicare Managed)  [facility]:  [address] [facility]  508 [facility] 200  [address] [place] [place] [address]  Phone: [phone]  Fax: [phone] Plan Frequency: 2 times per week for 12 weeks  Plan of Care/Certification Expiration Date: 24      >PT Visit Info:  Plan Frequency: 2 times per week for 12 weeks  Plan of Care/Certification Expiration Date: 08/10/23  Total # of Visits to Date: 24  Progress Note Counter: 8  Progress Note Due Date: 23      Visit Count:  23    OUTPATIENT PHYSICAL THERAPY: Treatment Note 2023       Episode  }Appt Desk             Treatment Diagnosis:      Left shoulder pain, unspecified chronicity  Stiffness of left shoulder, not elsewhere classified  SURGERY 10/6/23  No data found  Medical/Referring Diagnosis:      Referring Physician:  Amanda Moscoso MD MD Orders:  PT Eval and Treat   Date of Onset:  Onset Date: 23     Allergies:   Patient has no known allergies. Restrictions/Precautions:  Restrictions/Precautions: Weight Bearing  Right Upper Extremity Weight Bearing: Non Weight Bearing  Left Upper Extremity Weight Bearing: Non Weight Bearing     Interventions Planned (Treatment may consist of any combination of the following):    Current Treatment Recommendations: Strengthening; ROM; Transfer training; Manual; Pain management; Home exercise program; Patient/Caregiver education & training; Modalities     >Subjective Comments: 4 weeks this friday \"I am doing pretty good\"     >Initial:  3    /10>Post Session:     3   /10  Medications Last Reviewed:  2023  Updated Objective Findings:  None Today  Treatment   THERAPEUTIC EXERCISE: (38 minutes):    Exercises per grid below to improve mobility and strength. Required minimal verbal cues to promote proper body alignment and promote proper body posture. Progressed resistance and repetitions as indicated.    Date:  23 Date:

## 2023-11-06 ENCOUNTER — HOSPITAL ENCOUNTER (OUTPATIENT)
Dept: PHYSICAL THERAPY | Age: 68
Setting detail: RECURRING SERIES
Discharge: HOME | End: 2023-11-09
Attending: ORTHOPAEDIC SURGERY
Payer: MEDICARE

## 2023-11-06 PROCEDURE — 97110 THERAPEUTIC EXERCISES: CPT

## 2023-11-06 ASSESSMENT — PAIN SCALES - GENERAL: PAINLEVEL_OUTOF10: 0

## 2023-11-06 NOTE — PROGRESS NOTES
Hand 20 reps 20 reps    AROM L Wrtist Flexion/Extension 20 reps each 20 reps each    AROM L Wrist Radial Deviation/Ulnar Deviation 20 reps each 20 reps each    Scapular Retraction 15 reps 20 reps    Pendulum Exercises Circles CW/CCW  20 reps each Circles CW/CCW  20 reps each    PROM to Elbow into Flexion/Extension and Forearm into Pronation/Supination L UE L UE    PROM to L Shoulder Flexion to 90 degrees; External Rotation to 0 degrees L Shoulder            Treatment/Session Summary:    >Treatment Assessment:  Pt tolerated treatments well today with no c/o. Communication/Consultation:  None today  Equipment provided today:  none  Recommendations/Intent for next treatment session: Next visit will focus on ROM, pt education.     >Total Treatment Billable Duration:  40 minutes  Time In: 1100  Time Out: [address], PT       Charge Capture  }Post Session Pain  PT Visit Info  [facility] Portal  MD Guidelines  Scanned Media  Benefits  MyChart    Future Appointments   Date Time Provider [address] [place] [address]   11/7/2023  2:10 PM Sean Millan MD [facility] AMB   11/8/2023 11:00 AM Hermes Can, PTA [facility] SFE   11/13/2023 11:00 AM Hermes Can, PTA [facility] SFE   11/16/2023  1:45 PM Dhruv Gill PTA [facility] SFE

## 2023-11-07 ENCOUNTER — OFFICE VISIT (OUTPATIENT)
Dept: ORTHOPEDIC SURGERY | Age: 68
End: 2023-11-07

## 2023-11-07 DIAGNOSIS — M19.012 PRIMARY OSTEOARTHRITIS OF LEFT SHOULDER: ICD-10-CM

## 2023-11-07 DIAGNOSIS — Z09 SURGERY FOLLOW-UP: ICD-10-CM

## 2023-11-07 DIAGNOSIS — M75.102 LEFT ROTATOR CUFF TEAR ARTHROPATHY: Primary | ICD-10-CM

## 2023-11-07 DIAGNOSIS — Z96.612 S/P REVERSE TOTAL SHOULDER ARTHROPLASTY, LEFT: ICD-10-CM

## 2023-11-07 DIAGNOSIS — M12.812 LEFT ROTATOR CUFF TEAR ARTHROPATHY: Primary | ICD-10-CM

## 2023-11-07 NOTE — PROGRESS NOTES
Name: Kirsten Islas  YOB: 1955  Gender: male  MRN: [id number]    CC:   Chief Complaint   Patient presents with    Shoulder Pain     Recheck s/p left reverse TSA - DOS 10/6/23   The patient comes in today 4 weeks status post leftTSA. Left Shoulder Total Arthroplasty Reverse -regional Block -23 Hour Observation - Left  10/6/2023    HPI: The patient is doing well today. Their pain has decreased. They are attending physical therapy and are following the protocol. They feel as if they are progressing as expected. They deny use of narcotic pain medications. Review of Systems  Noncontributory    PE left shoulder : Their wounds are clean, dry, and intact and there is no sign of infection. They are neurovascularly intact. Their deltoid is firing well. Their Passive Shoulder Range of Motion is: Forward elevation: 110  Abduction: 90  External Rotation: 15    X-ray: Grashey and axillary lateral views of the operativeleft shoulder were obtained and reviewed today in the office. There has been no change in the hardware's alignment and the glenohumeral position is appropriate on all the films. AP:     ICD-10-CM    1. Left rotator cuff tear arthropathy  M75.102 XR SHOULDER LEFT (MIN 2 VIEWS)    M12.812       2. Primary osteoarthritis of left shoulder  M19.012 XR SHOULDER LEFT (MIN 2 VIEWS)      3. S/P reverse total shoulder arthroplasty, left  Z96.612 XR SHOULDER LEFT (MIN 2 VIEWS)      4. Surgery follow-up  Z09 XR SHOULDER LEFT (MIN 2 VIEWS)        The patient is doing well status post the above mentioned procedure. They need to continue with Physical Therapy per the protocol. They will follow-up with us in clinic in 4-6 weeks for repeat evaluation. Return in about 6 weeks (around 12/19/2023) for post op.      Fiordaliza Bhatti MD  11/07/23

## 2023-11-08 ENCOUNTER — HOSPITAL ENCOUNTER (OUTPATIENT)
Dept: PHYSICAL THERAPY | Age: 68
Setting detail: RECURRING SERIES
Discharge: HOME | End: 2023-11-11
Attending: ORTHOPAEDIC SURGERY
Payer: MEDICARE

## 2023-11-08 PROCEDURE — 97110 THERAPEUTIC EXERCISES: CPT

## 2023-11-08 ASSESSMENT — PAIN SCALES - GENERAL: PAINLEVEL_OUTOF10: 0

## 2023-11-08 NOTE — PROGRESS NOTES
Addie Peterson  : 1955  Primary: [address] [facility] Medicare Advantage (Medicare Managed)  Secondary:  [address] @ [facility]  508 [facility] 200  [address] [address]  Phone: [phone]  Fax: [phone] Plan Frequency: 2 times per week for 12 weeks  Plan of Care/Certification Expiration Date: 24      >PT Visit Info:  Plan Frequency: 2 times per week for 12 weeks  Plan of Care/Certification Expiration Date: 08/10/23  Total # of Visits to Date: 5  Progress Note Counter: 5  Progress Note Due Date: 23      Visit Count:  5    OUTPATIENT PHYSICAL THERAPY: Treatment Note 2023       Episode  }Appt Desk             Treatment Diagnosis:      Left shoulder pain, unspecified chronicity  Stiffness of left shoulder, not elsewhere classified  Medical/Referring Diagnosis:      Referring Physician:  Olive Renteria MD MD Orders:  PT Eval and Treat   Date of Onset:  Surgery 10-06-23   Allergies:   Patient has no known allergies. Restrictions/Precautions:  Restrictions/Precautions: Weight Bearing  Right Upper Extremity Weight Bearing: Non Weight Bearing  Left Upper Extremity Weight Bearing: Non Weight Bearing     Interventions Planned (Treatment may consist of any combination of the following):    Current Treatment Recommendations: Strengthening; ROM; Transfer training; Manual; Pain management; Home exercise program; Patient/Caregiver education & training; Modalities     >Subjective Comments:   Pt states his shoulder is doing well. He saw MD for follow up and pt states MD was pleased with his progress and took the abduction pillow out of his sling. Pt comes into therapy today with no sling on.  >Initial:     0/10>Post Session:       0/10  Medications Last Reviewed:  2023  Updated Objective Findings:  None Today  Treatment   THERAPEUTIC EXERCISE: (40 minutes):    Exercises per grid below to improve mobility and strength.   Required minimal verbal cues to promote proper body

## 2023-11-13 ENCOUNTER — HOSPITAL ENCOUNTER (OUTPATIENT)
Dept: PHYSICAL THERAPY | Age: 68
Setting detail: RECURRING SERIES
Discharge: HOME | End: 2023-11-16
Attending: ORTHOPAEDIC SURGERY
Payer: MEDICARE

## 2023-11-13 PROCEDURE — 97110 THERAPEUTIC EXERCISES: CPT

## 2023-11-13 NOTE — PROGRESS NOTES
Sherrill Paulis  : 1955  Primary: [address] [facility] Medicare Advantage (Medicare Managed)  Secondary:  [address] @ [facility]  508 [facility] 200  [address] [address]  Phone: [phone]  Fax: [phone] Plan Frequency: 2x/wk for 90 days  Plan of Care/Certification Expiration Date: 24      >PT Visit Info:  Plan Frequency: 2x/wk for 90 days  Plan of Care/Certification Expiration Date: 24  Total # of Visits to Date: 6  Progress Note Counter: 6      Visit Count:  5    OUTPATIENT PHYSICAL THERAPY: Treatment Note 2023       Episode  }Appt Desk             Treatment Diagnosis:      Left shoulder pain, unspecified chronicity  Stiffness of left shoulder, not elsewhere classified  Medical/Referring Diagnosis:      Referring Physician:  Eve Jimenez MD MD Orders:  PT Eval and Treat   Date of Onset:  Surgery 10-06-23   Allergies:   Patient has no known allergies. Restrictions/Precautions:  Restrictions/Precautions: Weight Bearing  Right Upper Extremity Weight Bearing: Non Weight Bearing  Left Upper Extremity Weight Bearing: Non Weight Bearing     Interventions Planned (Treatment may consist of any combination of the following):    Current Treatment Recommendations: Strengthening; ROM; Transfer training; Manual; Pain management; Home exercise program; Patient/Caregiver education & training; Modalities     >Subjective Comments: \"at night is is painful\"     >Initial:     3 /10>Post Session:    3    /10  Medications Last Reviewed:  2023  Updated Objective Findings:  None Today  Treatment   THERAPEUTIC EXERCISE: (40 minutes):    Exercises per grid below to improve mobility and strength. Required minimal verbal cues to promote proper body alignment and promote proper body posture. Progressed resistance and repetitions as indicated.    Date:  23 Date:  23 Date:  23   Activity/Exercise Parameters Parameters Parameters   UBE                        Pendulum

## 2023-11-15 ENCOUNTER — APPOINTMENT (OUTPATIENT)
Dept: PHYSICAL THERAPY | Age: 68
End: 2023-11-15
Attending: ORTHOPAEDIC SURGERY
Payer: MEDICARE

## 2023-11-16 ENCOUNTER — HOSPITAL ENCOUNTER (OUTPATIENT)
Dept: PHYSICAL THERAPY | Age: 68
Setting detail: RECURRING SERIES
Discharge: HOME | End: 2023-11-19
Attending: ORTHOPAEDIC SURGERY
Payer: MEDICARE

## 2023-11-16 PROCEDURE — 97110 THERAPEUTIC EXERCISES: CPT

## 2023-11-16 NOTE — PROGRESS NOTES
Parameters Parameters Parameters   AROM Gripping L Hand 20 reps 20 reps 20 reps   AROM L Wrtist Flexion/Extension 20 reps each 20 reps each 20 reps each   AROM L Wrist Radial Deviation/Ulnar Deviation 20 reps each 20 reps each 20 reps each   Scapular Retraction 15 reps 20 reps 20 reps   Pendulum Exercises Circles CW/CCW  20 reps each Circles CW/CCW  20 reps each Circles CW/CCW  20 reps each   PROM to Elbow into Flexion/Extension and Forearm into Pronation/Supination L UE L UE L UE   PROM to L Shoulder Flexion to 90 degrees; External Rotation to 0 degrees L Shoulder L Shoulder   Wand External Rotation in Supine   10 reps  5 sec holds  Pain-Free ROM   Wand Flexion in Supine   10 reps  5 sec holds  Pain-Free ROM     Treatment/Session Summary:    >Treatment Assessment:  Patient was late for appointment today. PROM progressing well L shoulder. Added light isometrics today. Equipment provided today:  none  Recommendations/Intent for next treatment session: Next visit will focus on ROM, pt education.     >Total Treatment Billable Duration:  30 minutes  Time In: 1400  Time Out: [address] [facility] [address], PTA       Charge Capture  }Post Session Pain  PT Visit Info  [employer] [facility] Portal  MD Guidelines  Scanned Media  Benefits  MyChart    Future Appointments   Date Time Provider [address]   12/19/2023  2:00 PM MD CRYS GillespieL AMB

## 2023-11-21 ENCOUNTER — HOSPITAL ENCOUNTER (OUTPATIENT)
Dept: PHYSICAL THERAPY | Age: 68
Setting detail: RECURRING SERIES
Discharge: HOME | End: 2023-11-24
Attending: ORTHOPAEDIC SURGERY
Payer: MEDICARE

## 2023-11-21 PROCEDURE — 97110 THERAPEUTIC EXERCISES: CPT

## 2023-11-21 ASSESSMENT — PAIN SCALES - GENERAL: PAINLEVEL_OUTOF10: 0

## 2023-11-21 NOTE — PROGRESS NOTES
Minoo Quiroz  : 1955  Primary: [address] [facility] Medicare Advantage (Medicare Managed)  Secondary:  [address] @ [facility]  [address] [facility] 200  [address] [address]  Phone: [phone]  Fax: [phone] Plan Frequency: 2x/wk for 90 days  Plan of Care/Certification Expiration Date: 24      >PT Visit Info:  Plan Frequency: 2x/wk for 90 days  Plan of Care/Certification Expiration Date: 24  Total # of Visits to Date: 25  Progress Note Counter: 6      Visit Count:  6   OUTPATIENT PHYSICAL THERAPY: Treatment Note 2023       Episode  }Appt Desk             Treatment Diagnosis:      Left shoulder pain, unspecified chronicity  Stiffness of left shoulder, not elsewhere classified  Medical/Referring Diagnosis:      M75.102, M12.812 (ICD-10-CM) - Left rotator cuff tear arthropathy   M19.012 (ICD-10-CM) - Primary osteoarthritis of left shoulder   Z96.612 (ICD-10-CM) - S/P reverse total shoulder arthroplasty, left   Z09 (ICD-10-CM) - Surgery follow-up     Referring Physician:  Idalia Tomlinson MD MD Orders:  PT Eval and Treat   Date of Onset:  Surgery 10-06-23   Allergies:   Patient has no known allergies. Restrictions/Precautions:  Restrictions/Precautions: Weight Bearing  Right Upper Extremity Weight Bearing: Non Weight Bearing  Left Upper Extremity Weight Bearing: Non Weight Bearing     Interventions Planned (Treatment may consist of any combination of the following):    Current Treatment Recommendations: Strengthening; ROM; Transfer training; Manual; Pain management; Home exercise program; Patient/Caregiver education & training; Modalities     >Subjective Comments:   Pt states his shoulder is doing well. >Initial:     0/10>Post Session:       0/10  Medications Last Reviewed:  2023  Updated Objective Findings:  None Today  Treatment   THERAPEUTIC EXERCISE: (40 minutes):    Exercises per grid below to improve mobility and strength.   Required minimal verbal cues to

## 2023-11-28 ENCOUNTER — HOSPITAL ENCOUNTER (OUTPATIENT)
Dept: PHYSICAL THERAPY | Age: 68
Setting detail: RECURRING SERIES
Discharge: HOME | End: 2023-12-01
Attending: ORTHOPAEDIC SURGERY
Payer: MEDICARE

## 2023-11-28 PROCEDURE — 97110 THERAPEUTIC EXERCISES: CPT

## 2023-11-28 ASSESSMENT — PAIN SCALES - GENERAL: PAINLEVEL_OUTOF10: 0

## 2023-11-28 NOTE — PROGRESS NOTES
Jalen Moctezuma  : 1955  Primary: [address] [facility] Medicare Advantage (Medicare Managed)  Secondary:  [address] @ [facility]  [address] [facility] 200  [address] [address]  Phone: [phone]  Fax: [phone] Plan Frequency: 2x/wk for 90 days  Plan of Care/Certification Expiration Date: 24      >PT Visit Info:  Plan Frequency: 2x/wk for 90 days  Plan of Care/Certification Expiration Date: 24  Total # of Visits to Date: 10  Progress Note Counter: 1      Visit Count:  10   OUTPATIENT PHYSICAL THERAPY: Treatment Note 2023       Episode  }Appt Desk             Treatment Diagnosis:      Left shoulder pain, unspecified chronicity  Stiffness of left shoulder, not elsewhere classified  Medical/Referring Diagnosis:      M75.102, M12.812 (ICD-10-CM) - Left rotator cuff tear arthropathy   M19.012 (ICD-10-CM) - Primary osteoarthritis of left shoulder   Z96.612 (ICD-10-CM) - S/P reverse total shoulder arthroplasty, left   Z09 (ICD-10-CM) - Surgery follow-up     Referring Physician:  Radames Viera MD MD Orders:  PT Eval and Treat   Date of Onset:  Surgery 10-06-23   Allergies:   Patient has no known allergies. Restrictions/Precautions:  Restrictions/Precautions: Weight Bearing  Right Upper Extremity Weight Bearing: Non Weight Bearing  Left Upper Extremity Weight Bearing: Non Weight Bearing     Interventions Planned (Treatment may consist of any combination of the following):    Current Treatment Recommendations: Strengthening; ROM; Transfer training; Manual; Pain management; Home exercise program; Patient/Caregiver education & training; Modalities     >Subjective Comments:   Pt states his shoulder feels stiff, but he is having no pain. >Initial:     0/10>Post Session:       0/10  Medications Last Reviewed:  2023  Updated Objective Findings:  None Today  Treatment   THERAPEUTIC EXERCISE: (40 minutes):    Exercises per grid below to improve mobility and strength.   Required

## 2023-11-28 NOTE — PROGRESS NOTES
Yo Stair  : 1955  Primary: [address] Medicare Advantage (Medicare Managed)  Secondary:  [address] @ [facility]  508 [facility] 200  [address] [address]  Phone: [phone]  Fax: [phone] Plan Frequency: 2x/wk for 90 days    Plan of Care/Certification Expiration Date: 24      PT Visit Info:  Plan Frequency: 2x/wk for 90 days  Plan of Care/Certification Expiration Date: 24  Total # of Visits to Date: 10  Progress Note Counter: 1      Visit Count:  10               OUTPATIENT PHYSICAL THERAPY:             Progress Report 2023               Episode (PT: s/p Left Reverse TSA) Appt Desk         Treatment Diagnosis:    Left shoulder pain, unspecified chronicity  Stiffness of left shoulder, not elsewhere classified  Medical/Referring Diagnosis:      M75.102, M12.812 (ICD-10-CM) - Left rotator cuff tear arthropathy   M19.012 (ICD-10-CM) - Primary osteoarthritis of left shoulder   Z96.612 (ICD-10-CM) - S/P reverse total shoulder arthroplasty, left   Z09 (ICD-10-CM) - Surgery follow-up     Referring Physician:  Estelita Felty, MD MD Orders:  PT Eval and Treat   Return MD Appt:  23  Date of Onset:    s/p Left Reverse TSA 10-06-23  Allergies:  Patient has no known allergies. Restrictions/Precautions:      S/p Left Reverse TSA 10-06-23     Medications Last Reviewed:  2023     SUBJECTIVE   History of Injury/Illness (Reason for Referral):  Pt had chronic bilateral shoulder OA. He had R shoulder Reverse TSA 2023. He then had surgery 10-06-23 for Left Reverse TSA. He spent one night in the hospital, then returned home. He rates his current R shoulder pain 1/10 sitting at rest, increasing to 4/10 at worst over the past week. He is taking Percocet and Aspirin prn for his L shoulder pain. He is R-handed. He lives alone in a [facility] house. Pt is retired.   Patient Stated Goal(s):  to decrease pain, weakness and stiffness L

## 2024-02-29 ENCOUNTER — APPOINTMENT (OUTPATIENT)
Dept: GENERAL RADIOLOGY | Age: 69
End: 2024-02-29
Payer: MEDICARE

## 2024-02-29 ENCOUNTER — HOSPITAL ENCOUNTER (EMERGENCY)
Age: 69
Discharge: HOME | End: 2024-02-29
Attending: STUDENT IN AN ORGANIZED HEALTH CARE EDUCATION/TRAINING PROGRAM
Payer: MEDICARE

## 2024-02-29 VITALS
BODY MASS INDEX: 27.58 KG/M2 | RESPIRATION RATE: 15 BRPM | DIASTOLIC BLOOD PRESSURE: 79 MMHG | WEIGHT: 197 LBS | OXYGEN SATURATION: 97 % | HEART RATE: 83 BPM | HEIGHT: 71 IN | TEMPERATURE: 98 F | SYSTOLIC BLOOD PRESSURE: 105 MMHG

## 2024-02-29 DIAGNOSIS — R07.9 CHEST PAIN, UNSPECIFIED TYPE: Primary | ICD-10-CM

## 2024-02-29 LAB
ALBUMIN SERPL-MCNC: 3.1 G/DL (ref 3.2–4.6)
ALBUMIN/GLOB SERPL: 1 (ref 0.4–1.6)
ALP SERPL-CCNC: 93 U/L (ref 50–136)
ALT SERPL-CCNC: 35 U/L (ref 12–65)
ANION GAP SERPL CALC-SCNC: 1 MMOL/L (ref 2–11)
AST SERPL-CCNC: 40 U/L (ref 15–37)
BASOPHILS # BLD: 0.1 K/UL (ref 0–0.2)
BASOPHILS NFR BLD: 1 % (ref 0–2)
BILIRUB SERPL-MCNC: 0.4 MG/DL (ref 0.2–1.1)
BUN SERPL-MCNC: 17 MG/DL (ref 8–23)
CALCIUM SERPL-MCNC: 8.5 MG/DL (ref 8.3–10.4)
CHLORIDE SERPL-SCNC: 114 MMOL/L (ref 103–113)
CO2 SERPL-SCNC: 26 MMOL/L (ref 21–32)
CREAT SERPL-MCNC: 1.1 MG/DL (ref 0.8–1.5)
D DIMER PPP FEU-MCNC: 0.7 UG/ML(FEU)
DIFFERENTIAL METHOD BLD: ABNORMAL
EKG ATRIAL RATE: 85 BPM
EKG DIAGNOSIS: NORMAL
EKG P AXIS: 63 DEGREES
EKG P-R INTERVAL: 164 MS
EKG Q-T INTERVAL: 389 MS
EKG QRS DURATION: 93 MS
EKG QTC CALCULATION (BAZETT): 460 MS
EKG R AXIS: 70 DEGREES
EKG T AXIS: 51 DEGREES
EKG VENTRICULAR RATE: 84 BPM
EOSINOPHIL # BLD: 0.6 K/UL (ref 0–0.8)
EOSINOPHIL NFR BLD: 4 % (ref 0.5–7.8)
ERYTHROCYTE [DISTWIDTH] IN BLOOD BY AUTOMATED COUNT: 13.2 % (ref 11.9–14.6)
GLOBULIN SER CALC-MCNC: 3 G/DL (ref 2.8–4.5)
GLUCOSE SERPL-MCNC: 134 MG/DL (ref 65–100)
HCT VFR BLD AUTO: 40.7 % (ref 41.1–50.3)
HGB BLD-MCNC: 13.8 G/DL (ref 13.6–17.2)
IMM GRANULOCYTES # BLD AUTO: 0.1 K/UL (ref 0–0.5)
IMM GRANULOCYTES NFR BLD AUTO: 1 % (ref 0–5)
LIPASE SERPL-CCNC: 167 U/L (ref 73–393)
LYMPHOCYTES # BLD: 1.5 K/UL (ref 0.5–4.6)
LYMPHOCYTES NFR BLD: 11 % (ref 13–44)
MCH RBC QN AUTO: 32.5 PG (ref 26.1–32.9)
MCHC RBC AUTO-ENTMCNC: 33.9 G/DL (ref 31.4–35)
MCV RBC AUTO: 95.8 FL (ref 82–102)
MONOCYTES # BLD: 0.7 K/UL (ref 0.1–1.3)
MONOCYTES NFR BLD: 5 % (ref 4–12)
NEUTS SEG # BLD: 10.4 K/UL (ref 1.7–8.2)
NEUTS SEG NFR BLD: 78 % (ref 43–78)
NRBC # BLD: 0 K/UL (ref 0–0.2)
PLATELET # BLD AUTO: 281 K/UL (ref 150–450)
PMV BLD AUTO: 9.7 FL (ref 9.4–12.3)
POTASSIUM SERPL-SCNC: 4.1 MMOL/L (ref 3.5–5.1)
PROT SERPL-MCNC: 6.1 G/DL (ref 6.3–8.2)
RBC # BLD AUTO: 4.25 M/UL (ref 4.23–5.6)
SODIUM SERPL-SCNC: 141 MMOL/L (ref 136–146)
TROPONIN I SERPL HS-MCNC: 4.1 PG/ML (ref 0–14)
TROPONIN I SERPL HS-MCNC: 4.2 PG/ML (ref 0–14)
WBC # BLD AUTO: 13.3 K/UL (ref 4.3–11.1)

## 2024-02-29 PROCEDURE — 93005 ELECTROCARDIOGRAM TRACING: CPT | Performed by: STUDENT IN AN ORGANIZED HEALTH CARE EDUCATION/TRAINING PROGRAM

## 2024-02-29 PROCEDURE — 99285 EMERGENCY DEPT VISIT HI MDM: CPT

## 2024-02-29 PROCEDURE — 83690 ASSAY OF LIPASE: CPT

## 2024-02-29 PROCEDURE — 93010 ELECTROCARDIOGRAM REPORT: CPT | Performed by: INTERNAL MEDICINE

## 2024-02-29 PROCEDURE — 80053 COMPREHEN METABOLIC PANEL: CPT

## 2024-02-29 PROCEDURE — 85379 FIBRIN DEGRADATION QUANT: CPT

## 2024-02-29 PROCEDURE — 71046 X-RAY EXAM CHEST 2 VIEWS: CPT

## 2024-02-29 PROCEDURE — 84484 ASSAY OF TROPONIN QUANT: CPT

## 2024-02-29 PROCEDURE — 85025 COMPLETE CBC W/AUTO DIFF WBC: CPT

## 2024-02-29 ASSESSMENT — PAIN SCALES - GENERAL: PAINLEVEL_OUTOF10: 0

## 2024-02-29 ASSESSMENT — HEART SCORE: ECG: 0

## 2024-02-29 ASSESSMENT — PAIN - FUNCTIONAL ASSESSMENT: PAIN_FUNCTIONAL_ASSESSMENT: 0-10

## 2024-02-29 NOTE — ED TRIAGE NOTES
Pt arrived via EMS from home. Pt c/o CP that started this morning. Pt denies SOB. Midsternal CP. No cardiac hx. Pt took 324 ASA. 1 nitro given en route by EMS. 112/70s, BP dropped to 80/50s after nitro. 400ml saline given for pressure. CP relief with nitro. . Sat 89 on RA, placed on 2L NC

## 2024-02-29 NOTE — ED PROVIDER NOTES
Emergency Department Provider Note       PCP: Albania Guerra DO   Age: 68 y.o.   Sex: male     DISPOSITION Decision To Discharge 02/29/2024 01:51:36 PM       ICD-10-CM    1. Chest pain, unspecified type  R07.9 [facility] [place]          Medical Decision Making     Well-appearing 68-year-old male patient presenting via EMS with substernal chest pain.  Received 1 nitro and route with resolution of pain as well as significant drop in his blood pressure.  He has received 400 cc of fluid with improvement in his pressure, denies active pain at the time my assessment  Orders placed for labs to include cardiac markers and chest  ED Course as of 02/29/24 1355   Thu Feb 29, 2024   0908 EKG interpretation: Sinus rhythm, rate 82, normal axis, occasional PVC present.  No obvious ischemic change. [BR]   0926 Subsequent EKG interpretation: Normal sinus rhythm, rate 84, normal axis, no obvious ischemia [BR]   1143 Age-adjusted D-dimer within normal limit [BR]   1352 Patient is in no further chest pain in this department.  Age-adjusted D-dimer and 2 troponins are within normal limits.  X-ray is clear.  Patient is requesting discharge.  I think he is stable for discharge with close outpatient cardiology follow-up.  Will arrange for [facility] cardiology referral line [BR]      ED Course User Index  [BR] Angelito Wooten DO     1 or more acute illnesses that pose a threat to life or bodily function.   Drug therapy given requiring intensive monitoring for toxicity.  Patient was discharged risks and benefits of hospitalization were considered.  Shared medical decision making was utilized in creating the patients health plan today.  Diagnosis or care significantly limited by social determinants of health chronic smoker.    I independently ordered and reviewed each unique test.  I reviewed external records: ED visit note from an outside group.  I reviewed external records: provider visit note from PCP.  I reviewed

## 2024-02-29 NOTE — DISCHARGE SUMMARY
I have reviewed discharge instructions with the patient.  The patient verbalized understanding.    Patient left ED via Discharge Method: ambulatory to Home with self.    Opportunity for questions and clarification provided.       Patient given 0 scripts.         To continue your aftercare when you leave the hospital, you may receive an automated call from our care team to check in on how you are doing.  This is a free service and part of our promise to provide the best care and service to meet your aftercare needs.” If you have questions, or wish to unsubscribe from this service please call [phone].  Thank you for Choosing our [facility].

## (undated) DEVICE — 2108 SERIES SAGITTAL BLADE (18.6 X 0.64 X 61.1MM)

## (undated) DEVICE — SUTURE MCRYL SZ 2-0 L27IN ABSRB UD CP-1 1 L36MM 1/2 CIR REV Y266H

## (undated) DEVICE — BANDAGE COBAN 4 IN COMPR W4INXL5YD FOAM COHESIVE QUIK STK SELF ADH SFT

## (undated) DEVICE — SYRINGE CATH TIP 50ML

## (undated) DEVICE — INSTRUMENT KIT SHLDR HEX PIN GPS

## (undated) DEVICE — T-MAX DISPOSABLE FACE MASK 8 PER BOX

## (undated) DEVICE — 3M™ STERI-DRAPE™ U-DRAPE 1015: Brand: STERI-DRAPE™

## (undated) DEVICE — PACK SURG PROC KNEE USER GPS

## (undated) DEVICE — CURETTE SURG FOR BNE CEM REM QUIK USE

## (undated) DEVICE — SUTURE STRATAFIX SPRL MCRYL + SZ 3-0 L18IN ABSRB UD PS-2 SXMP1B107

## (undated) DEVICE — TOTAL TRAY, DB, 100% SILI FOLEY, 16FR 10: Brand: MEDLINE

## (undated) DEVICE — STERILE PVP: Brand: MEDLINE INDUSTRIES, INC.

## (undated) DEVICE — SOLUTION IRRIG 1000ML 0.9% SOD CHL USP POUR PLAS BTL

## (undated) DEVICE — SUTURE ETHBND EXCEL SZ 5 L30IN NONABSORBABLE GRN L40MM V-37 MB66G

## (undated) DEVICE — LIQUIBAND RAPID ADHESIVE 36/CS 0.8ML: Brand: MEDLINE

## (undated) DEVICE — GARMENT,MEDLINE,DVT,INT,CALF,MED, GEN2: Brand: MEDLINE

## (undated) DEVICE — HOOD: Brand: FLYTE

## (undated) DEVICE — ADHESIVE SKIN CLSR 0.7ML TOP DERMBND ADV

## (undated) DEVICE — APPLICATOR MEDICATED 26 CC SOLUTION HI LT ORNG CHLORAPREP

## (undated) DEVICE — BLADE, TONGUE, 6", STERILE: Brand: MEDLINE

## (undated) DEVICE — PAD,ABDOMINAL,5"X9",ST,LF,25/BX: Brand: MEDLINE INDUSTRIES, INC.

## (undated) DEVICE — WATERPROOF, BACTERIA PROOF DRESSING WITH ABSORBENT SEE THROUGH PAD: Brand: OPSITE POST-OP VISIBLE 15X10CM CTN 20

## (undated) DEVICE — SUTURE STRATAFIX SZ 3-0 L30CM NONABSORBABLE UD L19MM FS-2 SXMP2B408

## (undated) DEVICE — WIRE SMOOTH 0.62X9IN K
Type: IMPLANTABLE DEVICE | Site: SHOULDER | Status: NON-FUNCTIONAL
Removed: 2023-04-14

## (undated) DEVICE — TOTAL SHOULDER DR KOCH: Brand: MEDLINE INDUSTRIES, INC.

## (undated) DEVICE — SLING ARM L ABV 13IN DE-ROTATION STRP HOOKS PROVIDE IMMOB

## (undated) DEVICE — ADHESIVE SKIN CLSR 1ML TISS HI VISC EXOFIN